# Patient Record
Sex: FEMALE | Employment: FULL TIME | URBAN - METROPOLITAN AREA
[De-identification: names, ages, dates, MRNs, and addresses within clinical notes are randomized per-mention and may not be internally consistent; named-entity substitution may affect disease eponyms.]

---

## 2020-08-20 ENCOUNTER — TELEPHONE (OUTPATIENT)
Dept: OBGYN CLINIC | Age: 30
End: 2020-08-20

## 2020-08-21 ENCOUNTER — OFFICE VISIT (OUTPATIENT)
Dept: OBGYN CLINIC | Age: 30
End: 2020-08-21
Payer: OTHER GOVERNMENT

## 2020-08-21 VITALS
TEMPERATURE: 99 F | SYSTOLIC BLOOD PRESSURE: 118 MMHG | HEIGHT: 60 IN | HEART RATE: 110 BPM | WEIGHT: 183 LBS | DIASTOLIC BLOOD PRESSURE: 78 MMHG | BODY MASS INDEX: 35.93 KG/M2

## 2020-08-21 PROCEDURE — 36415 COLL VENOUS BLD VENIPUNCTURE: CPT | Performed by: OBSTETRICS & GYNECOLOGY

## 2020-08-21 PROCEDURE — 99385 PREV VISIT NEW AGE 18-39: CPT | Performed by: OBSTETRICS & GYNECOLOGY

## 2020-08-21 RX ORDER — ALBUTEROL SULFATE 4 MG/1
4 TABLET, FILM COATED, EXTENDED RELEASE ORAL EVERY 12 HOURS
COMMUNITY

## 2020-08-21 RX ORDER — BUDESONIDE AND FORMOTEROL FUMARATE DIHYDRATE 160; 4.5 UG/1; UG/1
2 AEROSOL RESPIRATORY (INHALATION) 2 TIMES DAILY
COMMUNITY

## 2020-08-21 RX ORDER — LEVOTHYROXINE SODIUM 0.05 MG/1
50 TABLET ORAL DAILY
COMMUNITY
End: 2021-01-14 | Stop reason: SDUPTHER

## 2020-08-21 RX ORDER — DULOXETIN HYDROCHLORIDE 30 MG/1
30 CAPSULE, DELAYED RELEASE ORAL DAILY
COMMUNITY

## 2020-08-21 SDOH — HEALTH STABILITY: MENTAL HEALTH: HOW MANY STANDARD DRINKS CONTAINING ALCOHOL DO YOU HAVE ON A TYPICAL DAY?: 1 OR 2

## 2020-08-21 SDOH — HEALTH STABILITY: MENTAL HEALTH: HOW OFTEN DO YOU HAVE A DRINK CONTAINING ALCOHOL?: 2-4 TIMES A MONTH

## 2020-08-21 ASSESSMENT — ENCOUNTER SYMPTOMS
VOMITING: 0
NAUSEA: 0
DIARRHEA: 0
SHORTNESS OF BREATH: 0
ABDOMINAL PAIN: 0
CONSTIPATION: 0

## 2020-08-21 NOTE — PROGRESS NOTES
smears: denies    Medical History:  Past Medical History:   Diagnosis Date    Asthma     Hypothyroid     PTSD (post-traumatic stress disorder)        Surgical History:  Past Surgical History:   Procedure Laterality Date     SECTION      UMBILICAL HERNIA REPAIR         Medications:  Current Outpatient Medications   Medication Sig Dispense Refill    budesonide-formoterol (SYMBICORT) 160-4.5 MCG/ACT AERO Inhale 2 puffs into the lungs 2 times daily      levothyroxine (SYNTHROID) 50 MCG tablet Take 50 mcg by mouth Daily      DULoxetine (CYMBALTA) 30 MG extended release capsule Take 30 mg by mouth daily      albuterol (VOSPIRE ER) 4 MG extended release tablet Take 4 mg by mouth every 12 hours PRN       No current facility-administered medications for this visit. Allergies: Allergies   Allergen Reactions    Bactrim [Sulfamethoxazole-Trimethoprim] Hives       Social History:  Social History     Socioeconomic History    Marital status: Unknown     Spouse name: None    Number of children: None    Years of education: None    Highest education level: None   Occupational History    None   Social Needs    Financial resource strain: None    Food insecurity     Worry: None     Inability: None    Transportation needs     Medical: None     Non-medical: None   Tobacco Use    Smoking status: Never Smoker    Smokeless tobacco: Never Used   Substance and Sexual Activity    Alcohol use:  Yes     Alcohol/week: 1.0 standard drinks     Types: 1 Glasses of wine per week     Frequency: 2-4 times a month     Drinks per session: 1 or 2     Binge frequency: Never    Drug use: None    Sexual activity: Yes   Lifestyle    Physical activity     Days per week: None     Minutes per session: None    Stress: None   Relationships    Social connections     Talks on phone: None     Gets together: None     Attends Judaism service: None     Active member of club or organization: None     Attends meetings of clubs or organizations: None     Relationship status: None    Intimate partner violence     Fear of current or ex partner: None     Emotionally abused: None     Physically abused: None     Forced sexual activity: None   Other Topics Concern    None   Social History Narrative    None       Objective: Body mass index is 35.74 kg/m². /78 (Site: Left Upper Arm, Position: Sitting, Cuff Size: Large Adult)   Pulse 110   Temp 99 °F (37.2 °C)   Ht 5' (1.524 m)   Wt 183 lb (83 kg)   LMP 08/06/2020   BMI 35.74 kg/m²     Exam:   Physical Exam  Exam conducted with a chaperone present. Constitutional:       Appearance: She is well-developed. HENT:      Head: Normocephalic and atraumatic. Neck:      Musculoskeletal: Normal range of motion. Cardiovascular:      Rate and Rhythm: Normal rate and regular rhythm. Pulmonary:      Effort: Pulmonary effort is normal. No respiratory distress. Chest:      Breasts:         Right: Normal. No mass, nipple discharge or skin change. Left: Normal. No mass, nipple discharge or skin change. Abdominal:      General: There is no distension. Palpations: Abdomen is soft. Tenderness: There is no abdominal tenderness. There is no guarding or rebound. Genitourinary:     Comments: Pelvic exam: VULVA: normal appearing vulva with no masses, tenderness or lesions, VAGINA: normal appearing vagina with normal color and discharge, no lesions, CERVIX: normal appearing cervix without discharge or lesions, UTERUS: uterus is normal size, shape, consistency and nontender, ADNEXA: normal adnexa in size, nontender and no masses. Neurological:      Mental Status: She is alert and oriented to person, place, and time. Assessment/Plan:  31 y.o. P1 presenting for her annual exam:    1. Encounter for annual routine gynecological examination  Discussed age appropriate screening recommendations, pap smear sent today. Discussed breast self awareness, STI screening deferred.    -

## 2020-08-22 LAB
PROLACTIN: 11.9 NG/ML
TSH REFLEX: 3.04 UIU/ML (ref 0.27–4.2)

## 2020-10-26 ENCOUNTER — OFFICE VISIT (OUTPATIENT)
Dept: OBGYN CLINIC | Age: 30
End: 2020-10-26
Payer: OTHER GOVERNMENT

## 2020-10-26 ENCOUNTER — INITIAL PRENATAL (OUTPATIENT)
Dept: OBGYN CLINIC | Age: 30
End: 2020-10-26
Payer: OTHER GOVERNMENT

## 2020-10-26 VITALS
BODY MASS INDEX: 37.46 KG/M2 | DIASTOLIC BLOOD PRESSURE: 72 MMHG | HEART RATE: 88 BPM | WEIGHT: 191.8 LBS | SYSTOLIC BLOOD PRESSURE: 130 MMHG

## 2020-10-26 PROBLEM — Z87.59 HISTORY OF GESTATIONAL HYPERTENSION: Status: ACTIVE | Noted: 2020-10-26

## 2020-10-26 PROBLEM — O99.340 DEPRESSION AFFECTING PREGNANCY: Status: ACTIVE | Noted: 2020-10-26

## 2020-10-26 PROBLEM — J45.20 MILD INTERMITTENT ASTHMA WITHOUT COMPLICATION: Status: ACTIVE | Noted: 2020-10-26

## 2020-10-26 PROBLEM — Z34.91 PRENATAL CARE IN FIRST TRIMESTER: Status: ACTIVE | Noted: 2020-10-26

## 2020-10-26 PROBLEM — F32.A DEPRESSION AFFECTING PREGNANCY: Status: ACTIVE | Noted: 2020-10-26

## 2020-10-26 PROBLEM — Z98.891 HISTORY OF CESAREAN DELIVERY: Status: ACTIVE | Noted: 2020-10-26

## 2020-10-26 PROBLEM — E03.9 HYPOTHYROIDISM: Status: ACTIVE | Noted: 2020-10-26

## 2020-10-26 LAB
A/G RATIO: 1.2 (ref 1.1–2.2)
ABO/RH: NORMAL
ALBUMIN SERPL-MCNC: 3.8 G/DL (ref 3.4–5)
ALP BLD-CCNC: 61 U/L (ref 40–129)
ALT SERPL-CCNC: 26 U/L (ref 10–40)
AMPHETAMINE SCREEN, URINE: NORMAL
ANION GAP SERPL CALCULATED.3IONS-SCNC: 10 MMOL/L (ref 3–16)
ANTIBODY SCREEN: NORMAL
AST SERPL-CCNC: 29 U/L (ref 15–37)
BACTERIA: ABNORMAL /HPF
BARBITURATE SCREEN URINE: NORMAL
BASOPHILS ABSOLUTE: 0 K/UL (ref 0–0.2)
BASOPHILS RELATIVE PERCENT: 0.4 %
BENZODIAZEPINE SCREEN, URINE: NORMAL
BILIRUB SERPL-MCNC: <0.2 MG/DL (ref 0–1)
BILIRUBIN URINE: NEGATIVE
BLOOD, URINE: NEGATIVE
BUN BLDV-MCNC: 12 MG/DL (ref 7–20)
BUPRENORPHINE URINE: NORMAL
CALCIUM SERPL-MCNC: 9.6 MG/DL (ref 8.3–10.6)
CANNABINOID SCREEN URINE: NORMAL
CHLORIDE BLD-SCNC: 103 MMOL/L (ref 99–110)
CLARITY: ABNORMAL
CO2: 25 MMOL/L (ref 21–32)
COCAINE METABOLITE SCREEN URINE: NORMAL
COLOR: YELLOW
CONTROL: ABNORMAL
CREAT SERPL-MCNC: 0.5 MG/DL (ref 0.6–1.1)
CRL: NORMAL
CRYSTALS, UA: ABNORMAL /HPF
EOSINOPHILS ABSOLUTE: 0.1 K/UL (ref 0–0.6)
EOSINOPHILS RELATIVE PERCENT: 1.1 %
EPITHELIAL CELLS, UA: 6 /HPF (ref 0–5)
GFR AFRICAN AMERICAN: >60
GFR NON-AFRICAN AMERICAN: >60
GLOBULIN: 3.1 G/DL
GLUCOSE BLD-MCNC: 82 MG/DL (ref 70–99)
GLUCOSE URINE: NEGATIVE MG/DL
HCT VFR BLD CALC: 38 % (ref 36–48)
HEMOGLOBIN: 13.2 G/DL (ref 12–16)
HEPATITIS B SURFACE ANTIGEN INTERPRETATION: NORMAL
HYALINE CASTS: 1 /LPF (ref 0–8)
KETONES, URINE: NEGATIVE MG/DL
LEUKOCYTE ESTERASE, URINE: NEGATIVE
LYMPHOCYTES ABSOLUTE: 1.1 K/UL (ref 1–5.1)
LYMPHOCYTES RELATIVE PERCENT: 13.3 %
Lab: NORMAL
MCH RBC QN AUTO: 31.3 PG (ref 26–34)
MCHC RBC AUTO-ENTMCNC: 34.7 G/DL (ref 31–36)
MCV RBC AUTO: 90.1 FL (ref 80–100)
METHADONE SCREEN, URINE: NORMAL
MICROSCOPIC EXAMINATION: YES
MONOCYTES ABSOLUTE: 0.7 K/UL (ref 0–1.3)
MONOCYTES RELATIVE PERCENT: 8.8 %
NEUTROPHILS ABSOLUTE: 6.2 K/UL (ref 1.7–7.7)
NEUTROPHILS RELATIVE PERCENT: 76.4 %
NITRITE, URINE: NEGATIVE
OPIATE SCREEN URINE: NORMAL
OXYCODONE URINE: NORMAL
PDW BLD-RTO: 13.2 % (ref 12.4–15.4)
PH UA: 7.5 (ref 5–8)
PH UA: 8
PHENCYCLIDINE SCREEN URINE: NORMAL
PLATELET # BLD: 184 K/UL (ref 135–450)
PMV BLD AUTO: 11 FL (ref 5–10.5)
POTASSIUM SERPL-SCNC: 4.1 MMOL/L (ref 3.5–5.1)
PREGNANCY TEST URINE, POC: POSITIVE
PROPOXYPHENE SCREEN: NORMAL
PROTEIN UA: NEGATIVE MG/DL
RBC # BLD: 4.22 M/UL (ref 4–5.2)
RBC UA: 2 /HPF (ref 0–4)
RUBELLA ANTIBODY IGG: 233.6 IU/ML
SAC DIAMETER: NORMAL
SODIUM BLD-SCNC: 138 MMOL/L (ref 136–145)
SPECIFIC GRAVITY UA: 1.02 (ref 1–1.03)
TOTAL PROTEIN: 6.9 G/DL (ref 6.4–8.2)
TSH REFLEX: 3.86 UIU/ML (ref 0.27–4.2)
URINE TYPE: ABNORMAL
UROBILINOGEN, URINE: 0.2 E.U./DL
WBC # BLD: 8.1 K/UL (ref 4–11)
WBC UA: 0 /HPF (ref 0–5)

## 2020-10-26 PROCEDURE — 0500F INITIAL PRENATAL CARE VISIT: CPT | Performed by: OBSTETRICS & GYNECOLOGY

## 2020-10-26 PROCEDURE — 36415 COLL VENOUS BLD VENIPUNCTURE: CPT | Performed by: OBSTETRICS & GYNECOLOGY

## 2020-10-26 PROCEDURE — 76801 OB US < 14 WKS SINGLE FETUS: CPT | Performed by: OBSTETRICS & GYNECOLOGY

## 2020-10-26 RX ORDER — PYRIDOXINE HCL (VITAMIN B6) 25 MG
25 TABLET ORAL 2 TIMES DAILY PRN
Qty: 30 TABLET | Refills: 1 | Status: ON HOLD | OUTPATIENT
Start: 2020-10-26 | End: 2021-04-10 | Stop reason: HOSPADM

## 2020-10-26 ASSESSMENT — ENCOUNTER SYMPTOMS
DIARRHEA: 0
VOMITING: 0
NAUSEA: 1
CONSTIPATION: 0
SHORTNESS OF BREATH: 0

## 2020-10-26 NOTE — PROGRESS NOTES
PO) Take by mouth      budesonide-formoterol (SYMBICORT) 160-4.5 MCG/ACT AERO Inhale 2 puffs into the lungs 2 times daily      levothyroxine (SYNTHROID) 50 MCG tablet Take 50 mcg by mouth Daily      albuterol (VOSPIRE ER) 4 MG extended release tablet Take 4 mg by mouth every 12 hours PRN      DULoxetine (CYMBALTA) 30 MG extended release capsule Take 30 mg by mouth daily       No current facility-administered medications for this visit. Allergies:   Allergies   Allergen Reactions    Bactrim [Sulfamethoxazole-Trimethoprim] Hives       Social History:  Social History     Socioeconomic History    Marital status: Unknown     Spouse name: None    Number of children: None    Years of education: None    Highest education level: None   Occupational History    None   Social Needs    Financial resource strain: None    Food insecurity     Worry: None     Inability: None    Transportation needs     Medical: None     Non-medical: None   Tobacco Use    Smoking status: Never Smoker    Smokeless tobacco: Never Used   Substance and Sexual Activity    Alcohol use: Not Currently     Alcohol/week: 1.0 standard drinks     Types: 1 Glasses of wine per week     Frequency: 2-4 times a month     Drinks per session: 1 or 2     Binge frequency: Never    Drug use: Never    Sexual activity: Yes   Lifestyle    Physical activity     Days per week: None     Minutes per session: None    Stress: None   Relationships    Social connections     Talks on phone: None     Gets together: None     Attends Faith service: None     Active member of club or organization: None     Attends meetings of clubs or organizations: None     Relationship status: None    Intimate partner violence     Fear of current or ex partner: None     Emotionally abused: None     Physically abused: None     Forced sexual activity: None   Other Topics Concern    None   Social History Narrative    None       PhysicalExam:  /72   Pulse 88   Wt 191 lb 12.8 oz (87 kg)   LMP 08/06/2020   BMI 37.46 kg/m²   Physical Exam  Constitutional:       Appearance: Normal appearance. HENT:      Head: Normocephalic. Cardiovascular:      Rate and Rhythm: Normal rate and regular rhythm. Pulmonary:      Effort: Pulmonary effort is normal. No respiratory distress. Abdominal:      General: There is no distension. Palpations: Abdomen is soft. There is no mass. Tenderness: There is no abdominal tenderness. There is no guarding or rebound. Genitourinary:     Comments: Pelvic exam: VULVA: normal appearing vulva with no masses, tenderness or lesions, VAGINA: normal appearing vagina with normal color and discharge, no lesions, CERVIX: normal appearing cervix without discharge or lesions. Skin:     General: Skin is warm and dry. Neurological:      General: No focal deficit present. Mental Status: She is alert. Psychiatric:         Mood and Affect: Mood normal.           Labs:  No visits with results within 1 Day(s) from this visit. Latest known visit with results is:   Office Visit on 08/21/2020   Component Date Value    TSH 08/21/2020 3.04     Prolactin 08/21/2020 11.9        Most Recent Ultrasound:  Impression    OBSTETRIC ULTRASOUND--1ST TRIMESTER         DATE: 10/26/2020         PHYSICIAN: Loly Simms M.D.         SONOGRAPHER: [facility]         INDICATION: Amenorrhea         TYPE OF SCAN:  vaginal         FINDINGS:           The cul de sac is normal.  The cervix is normal.  The uterus is gravid.         The uterus measures 10.15 cm x 6.02 cm x 5.68 cm. No uterine anomalies are evident.         The right ovary is present. The right ovary measures 1.97 cm x 1.44 cm x 1.31 cm.           The left ovary is present.  The left ovary measures 2.99 cm x 1.96 cm x 1.86 cm.           There is a single intrauterine pregnancy identified.  A fetal pole is noted with a CRL measuring 1.68 cm, consistent with gestational age of 8weeks and 1days and EDC of 2021Zada Koch is a 2 day discrepancy when compared with the gestational age of 7weeks and 6days and EDC of 2021 set by FDP (2020). Yolk sac is present and measures 0.32 cm.      Fetal cardiac activity is present at 171 bpm.              IMPRESSION:      Single IUP with cardiac activity. Final EDC 2021.         Imaging is limited secondary to bowel gas.     Patient is well aware of the limitations of ultrasound in the detection of anomalies.               Assessment/Plan:   Angel Sesay is a 31 y.o. Stann [place] at 7+6 who presents for Initial OB visit    Problem List Items Addressed This Visit        Gynecology/Obstetric Problems    Prenatal care in first trimester - Primary     - FWB: reassuring by US today  - Genetic screening: desires NT, orders signed today  - Anatomy scan: plan at 20 weeks  - Flu shot: discuss at next visit  - Tdap: plan at 28wks  - PNL: sent today         Relevant Orders    C.trachomatis N.gonorrhoeae DNA    URINALYSIS WITH MICROSCOPIC    Culture, Urine    POCT urine pregnancy    VAGINAL PATHOGENS PROBE *A    TYPE AND SCREEN    CBC WITH AUTO DIFFERENTIAL    RUBELLA ANTIBODY, IGG    Syphilis Antibody Cascading Reflex    HEPATITIS B SURFACE ANTIGEN    TSH with Reflex    HIV-1 AND HIV-2 ANTIBODIES    Drug Screen Multi Urine With Bup    COMPREHENSIVE METABOLIC PANEL       Other    Hypothyroidism     Currently on synthroid 50mcg daily  - repeat TSH sent today         Relevant Orders    TSH with Reflex    History of gestational hypertension     Reports prior gHTN in labor  - baseline CBC/CMP today  - plan for baseline 24hr UP at next visit         Relevant Orders    CBC WITH AUTO DIFFERENTIAL    COMPREHENSIVE METABOLIC PANEL    Depression affecting pregnancy     On Cymbalta  - continue to monitor         Mild intermittent asthma without complication    History of  delivery     Desires RCD           Other Visit Diagnoses     Possible exposure to STD        Relevant Orders C.trachomatis N.gonorrhoeae DNA    URINALYSIS WITH MICROSCOPIC    Culture, Urine    VAGINAL PATHOGENS PROBE *A    Nausea and vomiting of pregnancy, antepartum        Rx B6/doxylamine sent today          Dispo: 4 weeks for Rik Zepeda MD

## 2020-10-26 NOTE — ASSESSMENT & PLAN NOTE
- FWB: reassuring by US today  - Genetic screening: desires NT, orders signed today  - Anatomy scan: plan at 20 weeks  - Flu shot: discuss at next visit  - Tdap: plan at 28wks  - PNL: sent today

## 2020-10-27 LAB
C TRACH DNA GENITAL QL NAA+PROBE: NEGATIVE
CANDIDA SPECIES, DNA PROBE: NORMAL
GARDNERELLA VAGINALIS, DNA PROBE: NORMAL
HIV AG/AB: NORMAL
HIV ANTIGEN: NORMAL
HIV-1 ANTIBODY: NORMAL
HIV-2 AB: NORMAL
N. GONORRHOEAE DNA: NEGATIVE
TOTAL SYPHILLIS IGG/IGM: NORMAL
TRICHOMONAS VAGINALIS DNA: NORMAL
URINE CULTURE, ROUTINE: NORMAL

## 2020-11-24 ENCOUNTER — TELEPHONE (OUTPATIENT)
Dept: OBGYN CLINIC | Age: 30
End: 2020-11-24

## 2020-11-25 ENCOUNTER — INITIAL PRENATAL (OUTPATIENT)
Dept: OBGYN CLINIC | Age: 30
End: 2020-11-25

## 2020-11-25 VITALS
BODY MASS INDEX: 37.4 KG/M2 | WEIGHT: 191.5 LBS | DIASTOLIC BLOOD PRESSURE: 80 MMHG | SYSTOLIC BLOOD PRESSURE: 116 MMHG | HEART RATE: 97 BPM

## 2020-11-25 PROCEDURE — 0502F SUBSEQUENT PRENATAL CARE: CPT | Performed by: OBSTETRICS & GYNECOLOGY

## 2020-11-25 RX ORDER — ONDANSETRON 4 MG/1
4 TABLET, ORALLY DISINTEGRATING ORAL EVERY 8 HOURS PRN
Qty: 20 TABLET | Refills: 1 | Status: SHIPPED | OUTPATIENT
Start: 2020-11-25 | End: 2020-12-23 | Stop reason: SDUPTHER

## 2020-11-25 RX ORDER — ASPIRIN 81 MG/1
81 TABLET ORAL DAILY
Qty: 30 TABLET | Refills: 3 | Status: ON HOLD | OUTPATIENT
Start: 2020-11-25 | End: 2021-06-03 | Stop reason: HOSPADM

## 2020-11-25 NOTE — ASSESSMENT & PLAN NOTE
- FWB: reassuring by US today  - Genetic screening: missed appt for NT, would like to just do MQS at next visit  - Anatomy scan: plan at 20 weeks  - Flu shot: plan at next visit  - Tdap: plan at 28wks  - PNL: A+/ab-, RI, HepB neg, RPRnr, HIV neg, UDS negative, urine culture negative, GCCT/trich next, Hgb 13.2

## 2020-11-25 NOTE — PROGRESS NOTES
Return OB Office Visit    CC:   Chief Complaint   Patient presents with    Routine Prenatal Visit       HPI:  Pt seen and examined. No concerns/complaints. Denies VB, leaking, uterine cramps. No fetal movement yet. Has started vomiting in the last week, nausea is OK. Worst in the morning, then better throughout the day. Maternal wellness questionnaire reviewed - no concerns today. Score 10.      Objective:  /80   Pulse 97   Wt 191 lb 8 oz (86.9 kg)   LMP 2020   BMI 37.40 kg/m²   Gen: AO, NAD  Abd: Soft, NT  FHT: 162 by BSUS    Assessment/Plan:  31 y.o. Ester Sebastian at 12w1d (Estimated Date of Delivery: 21) presents for PHILLIP appointment:     Problem List Items Addressed This Visit        Gynecology/Obstetric Problems    Prenatal care in first trimester - Primary     - FWB: reassuring by US today  - Genetic screening: missed appt for NT, would like to just do MQS at next visit  - Anatomy scan: plan at 20 weeks  - Flu shot: plan at next visit  - Tdap: plan at 28wks  - PNL: A+/ab-, RI, HepB neg, RPRnr, HIV neg, UDS negative, urine culture negative, GCCT/trich next, Hgb 13.2            Other    Hypothyroidism     Currently on synthroid 50mcg daily  - repeat TSH wnl, continue to monitor         History of gestational hypertension     Reports prior gHTN in labor  - baseline CBC/CMP wnl  - given collection supplies to bring 24HUP to next visit  - daily ASA         Depression affecting pregnancy     On Cymbalta  - continue to monitor  - [facility]         History of  delivery     Desires RCD               Dispo: RTC in 4 weeks, MQS and flu shot at that time  Carlos Alberto Monaco MD

## 2020-12-17 ENCOUNTER — TELEPHONE (OUTPATIENT)
Dept: OBGYN CLINIC | Age: 30
End: 2020-12-17

## 2020-12-18 ENCOUNTER — ROUTINE PRENATAL (OUTPATIENT)
Dept: OBGYN CLINIC | Age: 30
End: 2020-12-18
Payer: OTHER GOVERNMENT

## 2020-12-18 ENCOUNTER — TELEPHONE (OUTPATIENT)
Dept: OBGYN CLINIC | Age: 30
End: 2020-12-18

## 2020-12-18 VITALS
DIASTOLIC BLOOD PRESSURE: 82 MMHG | HEART RATE: 99 BPM | SYSTOLIC BLOOD PRESSURE: 138 MMHG | WEIGHT: 195.8 LBS | BODY MASS INDEX: 38.24 KG/M2

## 2020-12-18 PROBLEM — Z34.92 PRENATAL CARE IN SECOND TRIMESTER: Status: ACTIVE | Noted: 2020-10-26

## 2020-12-18 PROCEDURE — 0502F SUBSEQUENT PRENATAL CARE: CPT | Performed by: OBSTETRICS & GYNECOLOGY

## 2020-12-18 PROCEDURE — 36415 COLL VENOUS BLD VENIPUNCTURE: CPT | Performed by: OBSTETRICS & GYNECOLOGY

## 2020-12-18 RX ORDER — ONDANSETRON 4 MG/1
4 TABLET, ORALLY DISINTEGRATING ORAL EVERY 8 HOURS PRN
Qty: 20 TABLET | Refills: 1 | Status: SHIPPED | OUTPATIENT
Start: 2020-12-18 | End: 2020-12-23

## 2020-12-18 NOTE — ASSESSMENT & PLAN NOTE
- FWB: reassuring by micky today  - Genetic screening: MQS drawn today  - Anatomy scan: plan at 20 weeks  - Flu shot: plan at next visit  - Tdap: plan at 28wks  - PNL: A+/ab-, RI, HepB neg, RPRnr, HIV neg, UDS negative, urine culture negative, GCCT/trich next, Hgb 13.2

## 2020-12-18 NOTE — PROGRESS NOTES
Return OB Office Visit    CC:   Chief Complaint   Patient presents with    Routine Prenatal Visit       HPI:  Pt seen and examined. No concerns/complaints. Denies VB, LOF. ? small fetal movements. No regular painful cramps. Still having nausea, also now vomiting. Feels like it is getting worse. Using zofran every other day. Also using B6 and unisom. Mostly in the first half of the day, tolerating food but has lower appetite. Having smaller portions. Maternal wellness questionnaire reviewed - no concerns today. Score 11. Working with Dylan.      Objective:  /82   Pulse 99   Wt 195 lb 12.8 oz (88.8 kg)   LMP 2020   BMI 38.24 kg/m²   Gen: AO, NAD  Abd: Soft, NT  FHT: 151  Ext: Mild LE edema    Assessment/Plan:  31 y.o.  at 15w3d (Estimated Date of Delivery: 21) presents for PHILLIP appointment:     Problem List Items Addressed This Visit        Gynecology/Obstetric Problems    Prenatal care in second trimester - Primary     - FWB: reassuring by micky today  - Genetic screening: MQS drawn today  - Anatomy scan: plan at 20 weeks  - Flu shot: plan at next visit  - Tdap: plan at 28wks  - PNL: A+/ab-, RI, HepB neg, RPRnr, HIV neg, UDS negative, urine culture negative, GCCT/trich next, Hgb 13.2         Relevant Orders    CBC WITH AUTO DIFFERENTIAL    COMPREHENSIVE METABOLIC PANEL    MATERNAL SCREEN 4       Other    Hypothyroidism     Currently on synthroid 50mcg daily  - recheck TSH today         Relevant Orders    TSH with Reflex    History of gestational hypertension     Reports prior gHTN in labor  - baseline CBC/CMP wnl  - 24hr urine sent today  - continue daily ASA         Relevant Orders    CBC WITH AUTO DIFFERENTIAL    COMPREHENSIVE METABOLIC PANEL    PROTEIN, URINE, 24 HOUR    Depression affecting pregnancy     On Cymbalta  - continue to monitor  - [facility]         History of  delivery     Desires RCD               Dispo: RTC in 4 nino Corrales MD

## 2020-12-18 NOTE — TELEPHONE ENCOUNTER
Patient calling to have an order placed for the EKG to the [facility]. The EKG would be covered by insurance this way. Routing to Dr. Margo Lopez.

## 2020-12-19 LAB
24HR URINE VOLUME (ML): 500 ML
A/G RATIO: 1.1 (ref 1.1–2.2)
ALBUMIN SERPL-MCNC: 3.7 G/DL (ref 3.4–5)
ALP BLD-CCNC: 67 U/L (ref 40–129)
ALT SERPL-CCNC: 29 U/L (ref 10–40)
ANION GAP SERPL CALCULATED.3IONS-SCNC: 17 MMOL/L (ref 3–16)
AST SERPL-CCNC: 29 U/L (ref 15–37)
BASOPHILS ABSOLUTE: 0.1 K/UL (ref 0–0.2)
BASOPHILS RELATIVE PERCENT: 0.7 %
BILIRUB SERPL-MCNC: <0.2 MG/DL (ref 0–1)
BUN BLDV-MCNC: 11 MG/DL (ref 7–20)
CALCIUM SERPL-MCNC: 9.5 MG/DL (ref 8.3–10.6)
CHLORIDE BLD-SCNC: 103 MMOL/L (ref 99–110)
CO2: 19 MMOL/L (ref 21–32)
CREAT SERPL-MCNC: <0.5 MG/DL (ref 0.6–1.1)
CREATININE 24 HOUR URINE: 0.6 G/24HR (ref 0.6–1.5)
EOSINOPHILS ABSOLUTE: 0.1 K/UL (ref 0–0.6)
EOSINOPHILS RELATIVE PERCENT: 1.6 %
GFR AFRICAN AMERICAN: >60
GFR NON-AFRICAN AMERICAN: >60
GLOBULIN: 3.3 G/DL
GLUCOSE BLD-MCNC: 77 MG/DL (ref 70–99)
HCT VFR BLD CALC: 36.9 % (ref 36–48)
HEMOGLOBIN: 12.3 G/DL (ref 12–16)
LYMPHOCYTES ABSOLUTE: 0.9 K/UL (ref 1–5.1)
LYMPHOCYTES RELATIVE PERCENT: 11.4 %
MCH RBC QN AUTO: 30.4 PG (ref 26–34)
MCHC RBC AUTO-ENTMCNC: 33.4 G/DL (ref 31–36)
MCV RBC AUTO: 91.1 FL (ref 80–100)
MONOCYTES ABSOLUTE: 0.6 K/UL (ref 0–1.3)
MONOCYTES RELATIVE PERCENT: 7.9 %
NEUTROPHILS ABSOLUTE: 6.3 K/UL (ref 1.7–7.7)
NEUTROPHILS RELATIVE PERCENT: 78.4 %
PDW BLD-RTO: 13.7 % (ref 12.4–15.4)
PLATELET # BLD: 174 K/UL (ref 135–450)
PMV BLD AUTO: 10.9 FL (ref 5–10.5)
POTASSIUM SERPL-SCNC: 4.4 MMOL/L (ref 3.5–5.1)
PROTEIN 24 HOUR URINE: 0.05 G/24HR
RBC # BLD: 4.04 M/UL (ref 4–5.2)
SODIUM BLD-SCNC: 139 MMOL/L (ref 136–145)
TOTAL PROTEIN: 7 G/DL (ref 6.4–8.2)
TSH REFLEX: 2.17 UIU/ML (ref 0.27–4.2)
WBC # BLD: 8 K/UL (ref 4–11)

## 2020-12-22 ENCOUNTER — TELEPHONE (OUTPATIENT)
Dept: OBGYN CLINIC | Age: 30
End: 2020-12-22

## 2020-12-22 LAB
AFP INTERPRETATION: NORMAL
AFP MOM: 1.29
AFP SPECIMEN: NORMAL
D-INHIBIN: 115 PG/ML
DATING: NORMAL
EER MATERNAL SCREEN AFP, HCG, EST, INH: NORMAL
ESTIMATED DUE DATE: NORMAL
FETUS COUNT: NORMAL
GESTATIONAL AGE CALC AT COLLECT: NORMAL
HISTORY OF ANEUPLOIDY?: NO
HISTORY/NEURAL TUBE DEFECTS: NO
INSULIN DEP. DIABETIC: NO
MATERNAL AGE AT EDD: 29.3 YR
MATERNAL WEIGHT: NORMAL
MOM FOR HCG, 2ND TRIMESTER: 0.62
MOM FOR UE3: 0.9
MOM INHIBN: 0.78
PATIENT'S HCG, 2ND TRIMESTER: NORMAL IU/L
PT AFP: 32 NG/ML
PT UE3: 0.75 NG/ML
RACE: NORMAL
SMOKING: NO

## 2020-12-22 NOTE — TELEPHONE ENCOUNTER
Called Dr Rick Livingston office and spoke with Nurse Howard Hoff. She took the verbal order for Zofran as above and will call the office back if any issues.

## 2020-12-23 RX ORDER — ONDANSETRON 4 MG/1
4 TABLET, ORALLY DISINTEGRATING ORAL EVERY 8 HOURS PRN
Qty: 20 TABLET | Refills: 1 | Status: SHIPPED | OUTPATIENT
Start: 2020-12-23 | End: 2021-02-10 | Stop reason: SDUPTHER

## 2020-12-23 NOTE — TELEPHONE ENCOUNTER
Patient called in and stated she has a fax number we should send the script to at the [place] Fax: [phone] with name, , and last 4 of social.     Stated she also now has the maternity coordinator may be reached at Nurse Detts: [phone] ext [phone].

## 2020-12-23 NOTE — TELEPHONE ENCOUNTER
Placed call to [facility]. Stated they dont have the script. Want new one faxed to [phone], [address].

## 2020-12-23 NOTE — TELEPHONE ENCOUNTER
Prescription was printed and faxed last week, can we see if it is in the office and can be resent?  Thanks

## 2020-12-31 ENCOUNTER — TELEPHONE (OUTPATIENT)
Dept: OBGYN CLINIC | Age: 30
End: 2020-12-31

## 2020-12-31 NOTE — TELEPHONE ENCOUNTER
Patient called in and stated [facility] wherever Dr. Colleen Carbajal wants to refer her to. Stated she just needs the referral number attached when sent for billing.      Routing to Dr. Colleen Carbajal

## 2021-01-04 NOTE — TELEPHONE ENCOUNTER
[phone] (home)      Patient called and aware of central scheduling number, and also to access her mychart.

## 2021-01-08 ENCOUNTER — HOSPITAL ENCOUNTER (OUTPATIENT)
Age: 31
Discharge: HOME | End: 2021-01-08
Payer: OTHER GOVERNMENT

## 2021-01-08 DIAGNOSIS — R00.2 PALPITATIONS: ICD-10-CM

## 2021-01-08 LAB
EKG ATRIAL RATE: 83 BPM
EKG DIAGNOSIS: NORMAL
EKG P AXIS: 56 DEGREES
EKG P-R INTERVAL: 140 MS
EKG Q-T INTERVAL: 372 MS
EKG QRS DURATION: 92 MS
EKG QTC CALCULATION (BAZETT): 437 MS
EKG R AXIS: 69 DEGREES
EKG T AXIS: 28 DEGREES
EKG VENTRICULAR RATE: 83 BPM

## 2021-01-08 PROCEDURE — 93005 ELECTROCARDIOGRAM TRACING: CPT

## 2021-01-08 PROCEDURE — 93010 ELECTROCARDIOGRAM REPORT: CPT | Performed by: INTERNAL MEDICINE

## 2021-01-12 ENCOUNTER — TELEPHONE (OUTPATIENT)
Dept: OBGYN CLINIC | Age: 31
End: 2021-01-12

## 2021-01-13 ENCOUNTER — ROUTINE PRENATAL (OUTPATIENT)
Dept: OBGYN CLINIC | Age: 31
End: 2021-01-13

## 2021-01-13 ENCOUNTER — OFFICE VISIT (OUTPATIENT)
Dept: OBGYN CLINIC | Age: 31
End: 2021-01-13
Payer: OTHER GOVERNMENT

## 2021-01-13 VITALS
WEIGHT: 195.5 LBS | BODY MASS INDEX: 38.18 KG/M2 | HEART RATE: 85 BPM | SYSTOLIC BLOOD PRESSURE: 118 MMHG | DIASTOLIC BLOOD PRESSURE: 82 MMHG

## 2021-01-13 DIAGNOSIS — Z87.59 HISTORY OF GESTATIONAL HYPERTENSION: ICD-10-CM

## 2021-01-13 DIAGNOSIS — Z34.92 PRENATAL CARE IN SECOND TRIMESTER: Primary | ICD-10-CM

## 2021-01-13 DIAGNOSIS — Z98.891 HISTORY OF CESAREAN DELIVERY: ICD-10-CM

## 2021-01-13 DIAGNOSIS — E03.9 HYPOTHYROIDISM, UNSPECIFIED TYPE: ICD-10-CM

## 2021-01-13 DIAGNOSIS — F32.A DEPRESSION AFFECTING PREGNANCY: ICD-10-CM

## 2021-01-13 DIAGNOSIS — O99.340 DEPRESSION AFFECTING PREGNANCY: ICD-10-CM

## 2021-01-13 DIAGNOSIS — O44.40 LOW-LYING PLACENTA: ICD-10-CM

## 2021-01-13 LAB
ABDOMINAL CIRCUMFERENCE: NORMAL
BIPARIETAL DIAMETER: NORMAL
ESTIMATED FETAL WEIGHT: NORMAL
FEMORAL DIAMETER: NORMAL
HC/AC: NORMAL
HEAD CIRCUMFERENCE: NORMAL

## 2021-01-13 PROCEDURE — 76805 OB US >/= 14 WKS SNGL FETUS: CPT | Performed by: OBSTETRICS & GYNECOLOGY

## 2021-01-13 PROCEDURE — 0502F SUBSEQUENT PRENATAL CARE: CPT | Performed by: OBSTETRICS & GYNECOLOGY

## 2021-01-13 NOTE — PROGRESS NOTES
Temp 97.3 F infrared   Maternal emotional well being screening form completed and reviewed with patient. Current score is 8. Patient given referral to [facility] ([phone]):  No

## 2021-01-13 NOTE — PROGRESS NOTES
Return OB Office Visit    CC:   Chief Complaint   Patient presents with    Routine Prenatal Visit       HPI:  Pt seen and examined. No concerns/complaints. Denies VB, discharge, cramps/ctx. +FM. Still having nausea in the morning, starting get more of an appetite. Using the zofran still about once/day. No more chest palpitations or dizziness, EKG was normal. Otherwise feeling well today. Maternal wellness questionnaire reviewed - no concerns today. Score 8. Patient is already established with a counselor at the [place]. Objective:  /82   Pulse 85   Wt 195 lb 8 oz (88.7 kg)   LMP 09/01/2020   BMI 38.18 kg/m²   Gen: AO, NAD  Abd: Soft, NT  FHT: 146    Ultrasound:  Impression   OBSTETRIC ULTRASOUND -- SECOND TRIMESTER       DATE:  01/13/2021       PHYSICIAN: Praveen Beltran M.D.       SONOGRAPHER: [facility]       INDICATION:  Second trimester, Anatomical screening       TYPE OF SCAN: vaginal, abdominal 3.5 MHz 5MHz       FINDINGS:         A single viable intrauterine pregnancy is noted in cephalic presentation. Cardiac and somatic activity are noted.       The following values were obtained:   Fetal heart rate 146bpm   BPD 4.72cm 89.7 %   Head Circumference 16.82cm 58.9 %    Abdominal Circumference 15.96cm 94.1 %   Femur Length 3.02cm 50.3 %   Humerus Length 2.98cm 73.3 %   Cerebellum 2.05cm 68.0 %   Amniotic fluid DVP 5.93cm   EFW 338g 93.7 percentile       Subjective amniotic fluid volume is normal. Based on sonographic criteria, the estimated fetal age is 19weeks and 6days with EDC of 06/03/2021. Mariaelena Rivera is a 5 day discordance with the established EDC of 06/08/2021. The patient has an anterior placenta that is 1.71 cm to the internal cervical os. The evaluation of the lower uterine segment and cervix reveals normal appearing anatomy. Transvaginal cervical length is 5.76cm with no funneling noted. The uterus is unremarkable/gravid.  Maternal ovaries and adnexae are not well visualized due to the size of the uterus and patient's gravid state.       Normal Anatomy Seen:   C, T, L Spine CSP   Kidneys Lateral ventricles   Umbilical arteries Cerebellum              Bladder Cisterna magna              Nuchal fold   Diaphragm Upper extremities   Stomach Nose/lips Lower extremities   ACI PCI Choroid plexus       Suboptimal anatomy seen:   Fetal heart, face, sacrum, profile,       Abnormal anatomy seen:   Low-lying placenta       The fetal genitalia is noted to be Male.        IMPRESSION:   Single living IUP. No gross structural abnormalities are visualized.  Amniotic fluid volume is subjectively normal.       The patient is well aware of the limitations of ultrasound in the detection of fetal anomalies.  The scan is limited by fetal position and maternal body habitus.              Assessment/Plan:  31 y.o. Sury Duarte at 19w1d (Estimated Date of Delivery: 21) presents for PHILLIP appointment:     Problem List Items Addressed This Visit        Gynecology/Obstetric Problems    Prenatal care in second trimester - Primary     - FWB: reassuring by US today  - Genetic screening: MQS low risk  - Anatomy scan: done 21, suboptimal due to fetal positioning, low lying anterior placenta, normal CL and normal fluid, male fetus    - repeat US in 4 weeks  - Flu shot: discuss at next visit  - Tdap: plan at 28wks  - PNL: A+/ab-, RI, HepB neg, RPRnr, HIV neg, UDS negative, urine culture negative, GCCT/trich next, Hgb 13.2         Low-lying placenta     Will reevaluate at next visit, check placenta/uterine interface given h/o prior CD            Other    Hypothyroidism     Currently on synthroid 50mcg daily  - TSH  2.17  - will repeat with 3T labs         History of gestational hypertension     Reports prior gHTN in labor  - baseline CBC/CMP wnl  - 24hr urine wnl  - continue daily ASA         Depression affecting pregnancy     On Cymbalta  - continue to monitor  - [facility]         History of  delivery Desires RCD               Dispo: RTC in 4 weeks, US at that time  Odalys Robledo MD

## 2021-01-14 PROBLEM — O44.40 LOW-LYING PLACENTA: Status: ACTIVE | Noted: 2021-01-14

## 2021-01-14 RX ORDER — LEVOTHYROXINE SODIUM 0.05 MG/1
50 TABLET ORAL DAILY
Qty: 30 TABLET | Refills: 2 | Status: SHIPPED | OUTPATIENT
Start: 2021-01-14

## 2021-01-14 NOTE — ASSESSMENT & PLAN NOTE
- FWB: reassuring by US today  - Genetic screening: MQS low risk  - Anatomy scan: done 1/13/21, suboptimal due to fetal positioning, low lying anterior placenta, normal CL and normal fluid, male fetus    - repeat US in 4 weeks  - Flu shot: discuss at next visit  - Tdap: plan at 28wks  - PNL: A+/ab-, RI, HepB neg, RPRnr, HIV neg, UDS negative, urine culture negative, GCCT/trich next, Hgb 13.2

## 2021-01-19 ENCOUNTER — TELEPHONE (OUTPATIENT)
Dept: OBGYN CLINIC | Age: 31
End: 2021-01-19

## 2021-01-19 RX ORDER — BREAST PUMP
1 EACH MISCELLANEOUS PRN
Qty: 1 EACH | Refills: 0 | Status: SHIPPED | OUTPATIENT
Start: 2021-01-19

## 2021-01-19 NOTE — TELEPHONE ENCOUNTER
Pt requesting generic prescription for Breast pump. She would like to  to send to her insurance. Pended generic script. Please sign or advise  Pt is aware we are able to send via [employer] but would prefer hand script for insurance.

## 2021-02-09 ENCOUNTER — TELEPHONE (OUTPATIENT)
Dept: OBGYN CLINIC | Age: 31
End: 2021-02-09

## 2021-02-10 ENCOUNTER — OFFICE VISIT (OUTPATIENT)
Dept: OBGYN CLINIC | Age: 31
End: 2021-02-10
Payer: OTHER GOVERNMENT

## 2021-02-10 ENCOUNTER — ROUTINE PRENATAL (OUTPATIENT)
Dept: OBGYN CLINIC | Age: 31
End: 2021-02-10

## 2021-02-10 VITALS
HEART RATE: 105 BPM | DIASTOLIC BLOOD PRESSURE: 86 MMHG | WEIGHT: 199 LBS | SYSTOLIC BLOOD PRESSURE: 130 MMHG | BODY MASS INDEX: 38.86 KG/M2

## 2021-02-10 DIAGNOSIS — Z87.59 HISTORY OF GESTATIONAL HYPERTENSION: ICD-10-CM

## 2021-02-10 DIAGNOSIS — F32.A DEPRESSION AFFECTING PREGNANCY: ICD-10-CM

## 2021-02-10 DIAGNOSIS — Z98.891 HISTORY OF CESAREAN DELIVERY: ICD-10-CM

## 2021-02-10 DIAGNOSIS — Z34.92 PRENATAL CARE IN SECOND TRIMESTER: ICD-10-CM

## 2021-02-10 DIAGNOSIS — E03.8 OTHER SPECIFIED HYPOTHYROIDISM: ICD-10-CM

## 2021-02-10 DIAGNOSIS — O44.40 LOW-LYING PLACENTA: Primary | ICD-10-CM

## 2021-02-10 DIAGNOSIS — O99.340 DEPRESSION AFFECTING PREGNANCY: ICD-10-CM

## 2021-02-10 DIAGNOSIS — O44.40 LOW-LYING PLACENTA: ICD-10-CM

## 2021-02-10 PROCEDURE — 0502F SUBSEQUENT PRENATAL CARE: CPT | Performed by: OBSTETRICS & GYNECOLOGY

## 2021-02-10 PROCEDURE — 76816 OB US FOLLOW-UP PER FETUS: CPT | Performed by: OBSTETRICS & GYNECOLOGY

## 2021-02-10 RX ORDER — ONDANSETRON 4 MG/1
4 TABLET, ORALLY DISINTEGRATING ORAL EVERY 8 HOURS PRN
Qty: 20 TABLET | Refills: 1 | Status: SHIPPED | OUTPATIENT
Start: 2021-02-10 | End: 2021-02-11

## 2021-02-10 NOTE — PROGRESS NOTES
Return OB Office Visit    CC:   Chief Complaint   Patient presents with    Routine Prenatal Visit       HPI:  Pt seen and examined. No concerns/complaints. Denies VB, LOF, ctx. +FM. Did notice some upper abdominal tightening x1 last week, has not recurred since then and only lasted a few minutes. Also has some cervix pain 1x/week since last visit. Maternal wellness questionnaire reviewed - no concerns today. Score 7.      Objective:  /86   Pulse 105   Wt 199 lb (90.3 kg)   LMP 2020   BMI 38.86 kg/m²   Gen: AO, NAD  Abd: Soft, NT  FHT: 161    Assessment/Plan:  31 y.o.  at 23w1d (Estimated Date of Delivery: 21) presents for PHILLIP appointment:     Problem List Items Addressed This Visit        Gynecology/Obstetric Problems    Prenatal care in second trimester     - FWB: reassuring by US today  - Genetic screening: S low risk  - Anatomy scan: done 21, suboptimal due to fetal positioning, low lying anterior placenta, normal CL and normal fluid, male fetus    - completion 2/10/21, wnl aside from suboptimal LVOT and sacral spine  - Tdap: plan at 28wks  - PNL: A+/ab-, RI, HepB neg, RPRnr, HIV neg, UDS negative, urine culture negative, GCCT/trich next, Hgb 13.2    - GTT/CBC next visit         Low-lying placenta     Now 2.02cm from os, reevaluate at next visit  - normal appearing placenta/uterine interface noted            Other    Hypothyroidism     Currently on synthroid 50mcg daily  - TSH  2.17  - will repeat at next visit         History of gestational hypertension     Reports prior gHTN in labor  - baseline CBC/CMP wnl  - 24hr urine wnl  - continue daily ASA         Depression affecting pregnancy     On Cymbalta  - continue to monitor  - [facility]         History of  delivery     Desires RCD               Dispo: RTC in 4 weeks, GTT/CBC, Tdap and US at that time  Deana Gooden MD

## 2021-02-10 NOTE — ASSESSMENT & PLAN NOTE
- FWB: reassuring by US today  - Genetic screening: MQS low risk  - Anatomy scan: done 1/13/21, suboptimal due to fetal positioning, low lying anterior placenta, normal CL and normal fluid, male fetus    - completion 2/10/21, wnl aside from suboptimal LVOT and sacral spine  - Tdap: plan at 28wks  - PNL: A+/ab-, RI, HepB neg, RPRnr, HIV neg, UDS negative, urine culture negative, GCCT/trich next, Hgb 13.2    - GTT/CBC next visit

## 2021-02-10 NOTE — PROGRESS NOTES
Temp-97. 2f infrared  Maternal emotional well being screening form completed and reviewed with patient. Current score is 7. Patient given referral to [facility] ([phone]):  No

## 2021-02-11 RX ORDER — ONDANSETRON 4 MG/1
4 TABLET, ORALLY DISINTEGRATING ORAL EVERY 8 HOURS PRN
Qty: 20 TABLET | Refills: 1 | Status: SHIPPED | OUTPATIENT
Start: 2021-02-11

## 2021-02-17 ENCOUNTER — TELEPHONE (OUTPATIENT)
Dept: OBGYN CLINIC | Age: 31
End: 2021-02-17

## 2021-02-17 NOTE — TELEPHONE ENCOUNTER
Patient called in and stated the pharmacy had not gotten her script for the Zofran. I called pharmacy and verified they had not received. Called in a verbal at that time for the zofran 4 mg ODT 1 tablet every 8 hours as needed for nausea, with one refill.

## 2021-03-01 ENCOUNTER — TELEPHONE (OUTPATIENT)
Dept: OBGYN CLINIC | Age: 31
End: 2021-03-01

## 2021-03-01 NOTE — TELEPHONE ENCOUNTER
miri called in and stated patient is getting bills and she is not supposed to be getting any. Stated she needs to be billed through [employer], as patient is getting bills and is not supposed to be.     stated he will fax over information with attention to Dr. Hamzah Guerrier. Routing to Catrina/Patrizia.

## 2021-03-02 NOTE — TELEPHONE ENCOUNTER
Spoke with Glynn Garcia. They show everything pending insurance. They do not show that patient has received a statement for her prenatal care. They did note the account with patient's referral number [id number]. They did not take the po box to mail claims to as they said they have it on file. Danielle Lazo with the [facility] to let him know that all dates of service going back to 8/2020 are pending insurance. Patient has not received a statement since 8/2020 from [facility]. Confirmed referral number with Jessica Young and he voiced understanding.

## 2021-03-09 ENCOUNTER — TELEPHONE (OUTPATIENT)
Dept: OBGYN CLINIC | Age: 31
End: 2021-03-09

## 2021-03-10 ENCOUNTER — ROUTINE PRENATAL (OUTPATIENT)
Dept: OBGYN CLINIC | Age: 31
End: 2021-03-10
Payer: OTHER GOVERNMENT

## 2021-03-10 ENCOUNTER — OFFICE VISIT (OUTPATIENT)
Dept: OBGYN CLINIC | Age: 31
End: 2021-03-10
Payer: OTHER GOVERNMENT

## 2021-03-10 VITALS
BODY MASS INDEX: 39.33 KG/M2 | DIASTOLIC BLOOD PRESSURE: 66 MMHG | SYSTOLIC BLOOD PRESSURE: 110 MMHG | HEART RATE: 106 BPM | WEIGHT: 201.4 LBS

## 2021-03-10 DIAGNOSIS — E03.8 OTHER SPECIFIED HYPOTHYROIDISM: ICD-10-CM

## 2021-03-10 DIAGNOSIS — Z34.92 PRENATAL CARE IN SECOND TRIMESTER: ICD-10-CM

## 2021-03-10 DIAGNOSIS — Z98.891 HISTORY OF CESAREAN DELIVERY: ICD-10-CM

## 2021-03-10 DIAGNOSIS — Z34.92 PRENATAL CARE IN SECOND TRIMESTER: Primary | ICD-10-CM

## 2021-03-10 DIAGNOSIS — Z87.59 HISTORY OF GESTATIONAL HYPERTENSION: ICD-10-CM

## 2021-03-10 DIAGNOSIS — O44.40 LOW-LYING PLACENTA: Primary | ICD-10-CM

## 2021-03-10 DIAGNOSIS — O44.40 LOW-LYING PLACENTA: ICD-10-CM

## 2021-03-10 DIAGNOSIS — F32.A DEPRESSION AFFECTING PREGNANCY: ICD-10-CM

## 2021-03-10 DIAGNOSIS — O99.340 DEPRESSION AFFECTING PREGNANCY: ICD-10-CM

## 2021-03-10 PROCEDURE — 36415 COLL VENOUS BLD VENIPUNCTURE: CPT | Performed by: OBSTETRICS & GYNECOLOGY

## 2021-03-10 PROCEDURE — 76816 OB US FOLLOW-UP PER FETUS: CPT | Performed by: OBSTETRICS & GYNECOLOGY

## 2021-03-10 PROCEDURE — 0502F SUBSEQUENT PRENATAL CARE: CPT | Performed by: OBSTETRICS & GYNECOLOGY

## 2021-03-10 NOTE — PROGRESS NOTES
Return OB Office Visit    CC:   Chief Complaint   Patient presents with    Routine Prenatal Visit       HPI:  Pt seen and examined. No concerns/complaints. Denies VB, LOF, ctx. +FM    Just tired. Has had a hernia repair in 2017, starting to get very uncomfortable at that area and feels a tearing feeling there at time. Did talk to her PCP about palpitations, has a Holter monitor to track it. Maternal wellness questionnaire reviewed - no concerns today. Score 9. Objective:  /66   Pulse 106   Wt 201 lb 6.4 oz (91.4 kg)   LMP 09/01/2020   BMI 39.33 kg/m²   Gen: AO, NAD  Abd: Soft, NT  FHT: 130    Ultrasound:  Impression   OBSTETRICAL ULTRASOUND GROWTH       DATE: 3/10/21       PHYSICIAN: Sherin Moran M.D.        SONOGRAPHER: WILMER Murphy RDMS       INDICATION: Growth, anatomy follow up, placenta check       TYPE OF SCAN: abdominal       FINDINGS:   A single viable intrauterine pregnancy is noted in cephalic presentation. Cardiac and somatic activity are noted.       The following values were obtained:   Fetal heart rate 130bpm   BPD 7.42cm 97.6 %   Head Circumference 27.17cm 92.0 %    Abdominal Circumference 25.12cm 93.9 %   Femur Length 5.49cm 85.3 %   Humerus Length 4.53cm 34.3 %   Amniotic fluid index 16.13cm   EFW 1365g >97 percentile       Amniotic fluid volume is normal. Based on sonographic criteria the estimated fetal age is 28weeks and 6days with EDC of 5/27/21. There is a 12 day discordance with the established EDC of 6/8/21.        The patient has an anterior placenta that is adequate distance in relation to the internal cervical os.  The evaluation of the lower uterine segment and cervix reveals normal appearing anatomy.  The uterus is unremarkable/gravid.  Maternal ovaries and adnexae are not well visualized due to the size of the uterus and patient's gravid state.       Anatomy seen includes: heart, stomach, kidneys, bladder, spine, LVOT       IMPRESSION:   Single live IUP in the second trimester. Adequate interval fetal growth.        Imaging is limited secondary to fetal position.    The patient is well aware of the limitations of ultrasound in the detection of anomalies.             Assessment/Plan:  36 y.o.  at 27w1d (Estimated Date of Delivery: 21) presents for PHILLIP appointment:     Problem List Items Addressed This Visit        Gynecology/Obstetric Problems    Prenatal care in second trimester - Primary     - FWB: reassuring by US today  - Genetic screening: MQS low risk  - Anatomy scan: done 21, suboptimal due to fetal positioning, low lying anterior placenta, normal CL and normal fluid, male fetus    - completion 2/10/21, wnl aside from suboptimal LVOT and sacral spine    - scan 3/11/21 completed  - Tdap: 3/10/21  - PNL: A+/ab-, RI, HepB neg, RPRnr, HIV neg, UDS negative, urine culture negative, GCCT/trich next, Hgb 13.2    - GTT/CBC today         Relevant Orders    GLUCOSE CHALLENGE GESTATIONAL (Completed)    CBC WITH AUTO DIFFERENTIAL (Completed)    Low-lying placenta     Now >2.7cm from os  - normal appearing placenta/uterine interface noted, discussed with patient today            Other    Hypothyroidism     Currently on synthroid 50mcg daily  - TSH  2.17  - repeated today         Relevant Orders    TSH without Reflex (Completed)    T4, FREE (Completed)    History of gestational hypertension     Reports prior gHTN in labor  - baseline CBC/CMP wnl  - 24hr urine wnl  - continue daily ASA         Depression affecting pregnancy     On Cymbalta  - continue to monitor  - [facility]         History of  delivery     Desires RCD, will schedule at next visit               Dispo: RTC in 2 weeks  Alan Sherwood MD

## 2021-03-10 NOTE — PROGRESS NOTES
10:56 AM Given Tdap (Adacel) vaccine 0.5mL IM  Site:Left deltoid. Lot #[id number]  Expiration Date: 10/07/2022  Viridiana Briggs 49 #[id number]. Patient tolerated well. No reaction noted after 20 minutes. VIS sheet provided.   Administered by: Angel Carreon

## 2021-03-10 NOTE — PROGRESS NOTES
Temp-98.3F infrared  Patient began drinking Glucose 50G at 0850. Finished drinking at 9879. No c/o n/v at this time. Will continue to monitor patient.

## 2021-03-11 LAB
BASOPHILS ABSOLUTE: 0 K/UL (ref 0–0.2)
BASOPHILS RELATIVE PERCENT: 0.4 %
EOSINOPHILS ABSOLUTE: 0.1 K/UL (ref 0–0.6)
EOSINOPHILS RELATIVE PERCENT: 0.9 %
GLUCOSE CHALLENGE: 118 MG/DL
HCT VFR BLD CALC: 33.5 % (ref 36–48)
HEMOGLOBIN: 11 G/DL (ref 12–16)
LYMPHOCYTES ABSOLUTE: 0.7 K/UL (ref 1–5.1)
LYMPHOCYTES RELATIVE PERCENT: 10.6 %
MCH RBC QN AUTO: 29.9 PG (ref 26–34)
MCHC RBC AUTO-ENTMCNC: 32.8 G/DL (ref 31–36)
MCV RBC AUTO: 91.2 FL (ref 80–100)
MONOCYTES ABSOLUTE: 0.4 K/UL (ref 0–1.3)
MONOCYTES RELATIVE PERCENT: 6.6 %
NEUTROPHILS ABSOLUTE: 5.5 K/UL (ref 1.7–7.7)
NEUTROPHILS RELATIVE PERCENT: 81.5 %
PDW BLD-RTO: 14.5 % (ref 12.4–15.4)
PLATELET # BLD: 173 K/UL (ref 135–450)
PMV BLD AUTO: 10.5 FL (ref 5–10.5)
RBC # BLD: 3.67 M/UL (ref 4–5.2)
T4 FREE: 0.9 NG/DL (ref 0.9–1.8)
TSH SERPL DL<=0.05 MIU/L-ACNC: 2.45 UIU/ML (ref 0.27–4.2)
WBC # BLD: 6.8 K/UL (ref 4–11)

## 2021-03-11 NOTE — ASSESSMENT & PLAN NOTE
- FWB: reassuring by US today  - Genetic screening: MQS low risk  - Anatomy scan: done 1/13/21, suboptimal due to fetal positioning, low lying anterior placenta, normal CL and normal fluid, male fetus    - completion 2/10/21, wnl aside from suboptimal LVOT and sacral spine    - scan 3/11/21 completed  - Tdap: 3/10/21  - PNL: A+/ab-, RI, HepB neg, RPRnr, HIV neg, UDS negative, urine culture negative, GCCT/trich next, Hgb 13.2    - GTT/CBC today

## 2021-03-11 NOTE — ASSESSMENT & PLAN NOTE
Now >2.7cm from os  - normal appearing placenta/uterine interface noted, discussed with patient today

## 2021-03-24 ENCOUNTER — OFFICE VISIT (OUTPATIENT)
Dept: OBGYN CLINIC | Age: 31
End: 2021-03-24
Payer: OTHER GOVERNMENT

## 2021-03-24 ENCOUNTER — ROUTINE PRENATAL (OUTPATIENT)
Dept: OBGYN CLINIC | Age: 31
End: 2021-03-24

## 2021-03-24 VITALS
BODY MASS INDEX: 39.87 KG/M2 | SYSTOLIC BLOOD PRESSURE: 126 MMHG | DIASTOLIC BLOOD PRESSURE: 80 MMHG | HEART RATE: 106 BPM | WEIGHT: 204.13 LBS

## 2021-03-24 DIAGNOSIS — O44.40 LOW-LYING PLACENTA: ICD-10-CM

## 2021-03-24 DIAGNOSIS — O99.340 DEPRESSION AFFECTING PREGNANCY: ICD-10-CM

## 2021-03-24 DIAGNOSIS — E03.9 HYPOTHYROIDISM, UNSPECIFIED TYPE: ICD-10-CM

## 2021-03-24 DIAGNOSIS — Z87.59 HISTORY OF GESTATIONAL HYPERTENSION: ICD-10-CM

## 2021-03-24 DIAGNOSIS — Z34.93 PRENATAL CARE IN THIRD TRIMESTER: Primary | ICD-10-CM

## 2021-03-24 DIAGNOSIS — Z34.92 PRENATAL CARE IN SECOND TRIMESTER: Primary | ICD-10-CM

## 2021-03-24 DIAGNOSIS — Z98.891 HISTORY OF CESAREAN DELIVERY: ICD-10-CM

## 2021-03-24 DIAGNOSIS — F32.A DEPRESSION AFFECTING PREGNANCY: ICD-10-CM

## 2021-03-24 PROCEDURE — 76815 OB US LIMITED FETUS(S): CPT | Performed by: OBSTETRICS & GYNECOLOGY

## 2021-03-24 PROCEDURE — 0502F SUBSEQUENT PRENATAL CARE: CPT | Performed by: OBSTETRICS & GYNECOLOGY

## 2021-03-24 NOTE — PROGRESS NOTES
Return OB Office Visit    CC:   Chief Complaint   Patient presents with    Routine Prenatal Visit       HPI:  Pt seen and examined. No concerns/complaints. Denies VB, LOF, ctx. +FM. Wearing monitor for previous episodes of tachycardia, denies any recent episodes. Maternal wellness questionnaire reviewed - no concerns today. Score 10.      Objective:  /80   Pulse 106   Wt 204 lb 2 oz (92.6 kg)   LMP 2020   BMI 39.87 kg/m²   Gen: AO, NAD  Abd: Soft, NT  FHT: 164    Assessment/Plan:  36 y.o.  at 29w1d (Estimated Date of Delivery: 21) presents for PHILLIP appointment:     Problem List Items Addressed This Visit        Gynecology/Obstetric Problems    Prenatal care in third trimester - Primary     - FWB: reassuring by US today  - Genetic screening: MQS low risk  - Anatomy scan: done 21, suboptimal due to fetal positioning, low lying anterior placenta, normal CL and normal fluid, male fetus    - completion 2/10/21, wnl aside from suboptimal LVOT and sacral spine    - scan 3/11/21 completed  - Tdap: 3/10/21  - PNL: A+/ab-, RI, HepB neg, RPRnr, HIV neg, UDS negative, urine culture negative, GCCT/trich next, Hgb 13.2    - 1hr , Hgb 11.0, plt 173         Low-lying placenta     Resolved, now >2.7cm from os  - normal appearing placenta/uterine interface             Other    Hypothyroidism     Currently on synthroid 50mcg daily  - TSH  2.17  - TSH 3/11 2.45, T4 0.9         History of gestational hypertension     Reports prior gHTN in labor  - baseline CBC/CMP wnl  - 24hr urine wnl  - continue daily ASA         Depression affecting pregnancy     On Cymbalta  - continue to monitor  - [facility]         History of  delivery     RCD scheduled 21 at 0800               Dispo: RTC in 2 weeks  Mena Victor MD

## 2021-03-24 NOTE — PROGRESS NOTES
Temp 98 F infrared  Maternal emotional well being screening form completed and reviewed with patient. Current score is 10. Patient given referral to [facility] ([phone]):  No

## 2021-03-25 NOTE — ASSESSMENT & PLAN NOTE
- FWB: reassuring by US today  - Genetic screening: MQS low risk  - Anatomy scan: done 1/13/21, suboptimal due to fetal positioning, low lying anterior placenta, normal CL and normal fluid, male fetus    - completion 2/10/21, wnl aside from suboptimal LVOT and sacral spine    - scan 3/11/21 completed  - Tdap: 3/10/21  - PNL: A+/ab-, RI, HepB neg, RPRnr, HIV neg, UDS negative, urine culture negative, GCCT/trich next, Hgb 13.2    - 1hr , Hgb 11.0, plt 173

## 2021-04-08 ENCOUNTER — ROUTINE PRENATAL (OUTPATIENT)
Dept: OBGYN CLINIC | Age: 31
End: 2021-04-08

## 2021-04-08 VITALS
BODY MASS INDEX: 39.41 KG/M2 | HEART RATE: 122 BPM | DIASTOLIC BLOOD PRESSURE: 70 MMHG | WEIGHT: 201.8 LBS | SYSTOLIC BLOOD PRESSURE: 124 MMHG

## 2021-04-08 DIAGNOSIS — F32.A DEPRESSION AFFECTING PREGNANCY: ICD-10-CM

## 2021-04-08 DIAGNOSIS — O44.40 LOW-LYING PLACENTA: ICD-10-CM

## 2021-04-08 DIAGNOSIS — Z87.59 HISTORY OF GESTATIONAL HYPERTENSION: ICD-10-CM

## 2021-04-08 DIAGNOSIS — Z98.891 HISTORY OF CESAREAN DELIVERY: ICD-10-CM

## 2021-04-08 DIAGNOSIS — O99.340 DEPRESSION AFFECTING PREGNANCY: ICD-10-CM

## 2021-04-08 DIAGNOSIS — E03.8 OTHER SPECIFIED HYPOTHYROIDISM: ICD-10-CM

## 2021-04-08 DIAGNOSIS — Z34.93 PRENATAL CARE IN THIRD TRIMESTER: Primary | ICD-10-CM

## 2021-04-08 PROCEDURE — 0502F SUBSEQUENT PRENATAL CARE: CPT | Performed by: OBSTETRICS & GYNECOLOGY

## 2021-04-09 ENCOUNTER — TELEPHONE (OUTPATIENT)
Dept: OBGYN CLINIC | Age: 31
End: 2021-04-09

## 2021-04-09 ENCOUNTER — HOSPITAL ENCOUNTER (OUTPATIENT)
Age: 31
Setting detail: OBSERVATION
Discharge: HOME | End: 2021-04-10
Attending: OBSTETRICS & GYNECOLOGY | Admitting: OBSTETRICS & GYNECOLOGY
Payer: OTHER GOVERNMENT

## 2021-04-09 PROBLEM — R00.0 TACHYCARDIA: Status: ACTIVE | Noted: 2021-04-09

## 2021-04-09 PROBLEM — R94.31 ABNORMAL EKG: Status: ACTIVE | Noted: 2021-04-09

## 2021-04-09 PROBLEM — Z34.83 PRENATAL CARE, SUBSEQUENT PREGNANCY IN THIRD TRIMESTER: Status: ACTIVE | Noted: 2020-10-26

## 2021-04-09 LAB
A/G RATIO: 1.2 (ref 1.1–2.2)
ALBUMIN SERPL-MCNC: 3.3 G/DL (ref 3.4–5)
ALP BLD-CCNC: 104 U/L (ref 40–129)
ALT SERPL-CCNC: 16 U/L (ref 10–40)
ANION GAP SERPL CALCULATED.3IONS-SCNC: 10 MMOL/L (ref 3–16)
AST SERPL-CCNC: 27 U/L (ref 15–37)
BASOPHILS ABSOLUTE: 0 K/UL (ref 0–0.2)
BASOPHILS RELATIVE PERCENT: 0.3 %
BILIRUB SERPL-MCNC: <0.2 MG/DL (ref 0–1)
BUN BLDV-MCNC: 6 MG/DL (ref 7–20)
CALCIUM SERPL-MCNC: 9.1 MG/DL (ref 8.3–10.6)
CHLORIDE BLD-SCNC: 100 MMOL/L (ref 99–110)
CO2: 21 MMOL/L (ref 21–32)
CREAT SERPL-MCNC: <0.5 MG/DL (ref 0.6–1.1)
EOSINOPHILS ABSOLUTE: 0.1 K/UL (ref 0–0.6)
EOSINOPHILS RELATIVE PERCENT: 1.5 %
GFR AFRICAN AMERICAN: >60
GFR NON-AFRICAN AMERICAN: >60
GLOBULIN: 2.8 G/DL
GLUCOSE BLD-MCNC: 142 MG/DL (ref 70–99)
HCT VFR BLD CALC: 36 % (ref 36–48)
HEMOGLOBIN: 12 G/DL (ref 12–16)
LYMPHOCYTES ABSOLUTE: 1.2 K/UL (ref 1–5.1)
LYMPHOCYTES RELATIVE PERCENT: 12.9 %
MCH RBC QN AUTO: 29.5 PG (ref 26–34)
MCHC RBC AUTO-ENTMCNC: 33.2 G/DL (ref 31–36)
MCV RBC AUTO: 88.7 FL (ref 80–100)
MONOCYTES ABSOLUTE: 1.1 K/UL (ref 0–1.3)
MONOCYTES RELATIVE PERCENT: 11.9 %
NEUTROPHILS ABSOLUTE: 6.5 K/UL (ref 1.7–7.7)
NEUTROPHILS RELATIVE PERCENT: 73.4 %
PDW BLD-RTO: 15 % (ref 12.4–15.4)
PLATELET # BLD: 200 K/UL (ref 135–450)
PMV BLD AUTO: 10.6 FL (ref 5–10.5)
POTASSIUM SERPL-SCNC: 3.9 MMOL/L (ref 3.5–5.1)
RBC # BLD: 4.06 M/UL (ref 4–5.2)
SODIUM BLD-SCNC: 131 MMOL/L (ref 136–145)
TOTAL PROTEIN: 6.1 G/DL (ref 6.4–8.2)
WBC # BLD: 8.9 K/UL (ref 4–11)

## 2021-04-09 PROCEDURE — 6370000000 HC RX 637 (ALT 250 FOR IP): Performed by: OBSTETRICS & GYNECOLOGY

## 2021-04-09 PROCEDURE — 96361 HYDRATE IV INFUSION ADD-ON: CPT

## 2021-04-09 PROCEDURE — 85025 COMPLETE CBC W/AUTO DIFF WBC: CPT

## 2021-04-09 PROCEDURE — G0378 HOSPITAL OBSERVATION PER HR: HCPCS

## 2021-04-09 PROCEDURE — 2580000003 HC RX 258: Performed by: INTERNAL MEDICINE

## 2021-04-09 PROCEDURE — 99214 OFFICE O/P EST MOD 30 MIN: CPT | Performed by: OBSTETRICS & GYNECOLOGY

## 2021-04-09 PROCEDURE — 6370000000 HC RX 637 (ALT 250 FOR IP): Performed by: INTERNAL MEDICINE

## 2021-04-09 PROCEDURE — 80053 COMPREHEN METABOLIC PANEL: CPT

## 2021-04-09 PROCEDURE — 99243 OFF/OP CNSLTJ NEW/EST LOW 30: CPT | Performed by: INTERNAL MEDICINE

## 2021-04-09 PROCEDURE — 93005 ELECTROCARDIOGRAM TRACING: CPT | Performed by: OBSTETRICS & GYNECOLOGY

## 2021-04-09 PROCEDURE — 96360 HYDRATION IV INFUSION INIT: CPT

## 2021-04-09 RX ORDER — LABETALOL 100 MG/1
50 TABLET, FILM COATED ORAL EVERY 12 HOURS SCHEDULED
Status: DISCONTINUED | OUTPATIENT
Start: 2021-04-09 | End: 2021-04-10 | Stop reason: HOSPADM

## 2021-04-09 RX ORDER — ONDANSETRON 2 MG/ML
4 INJECTION INTRAMUSCULAR; INTRAVENOUS EVERY 6 HOURS PRN
Status: DISCONTINUED | OUTPATIENT
Start: 2021-04-09 | End: 2021-04-10 | Stop reason: HOSPADM

## 2021-04-09 RX ORDER — ACETAMINOPHEN 325 MG/1
650 TABLET ORAL EVERY 4 HOURS PRN
Status: DISCONTINUED | OUTPATIENT
Start: 2021-04-09 | End: 2021-04-10 | Stop reason: HOSPADM

## 2021-04-09 RX ORDER — SODIUM CHLORIDE, SODIUM LACTATE, POTASSIUM CHLORIDE, CALCIUM CHLORIDE 600; 310; 30; 20 MG/100ML; MG/100ML; MG/100ML; MG/100ML
INJECTION, SOLUTION INTRAVENOUS CONTINUOUS
Status: DISCONTINUED | OUTPATIENT
Start: 2021-04-09 | End: 2021-04-10 | Stop reason: HOSPADM

## 2021-04-09 RX ORDER — PROMETHAZINE HYDROCHLORIDE 25 MG/1
12.5 TABLET ORAL EVERY 6 HOURS PRN
Status: DISCONTINUED | OUTPATIENT
Start: 2021-04-09 | End: 2021-04-10 | Stop reason: HOSPADM

## 2021-04-09 RX ORDER — LEVOTHYROXINE SODIUM 0.03 MG/1
50 TABLET ORAL DAILY
Status: DISCONTINUED | OUTPATIENT
Start: 2021-04-09 | End: 2021-04-10 | Stop reason: HOSPADM

## 2021-04-09 RX ORDER — DULOXETIN HYDROCHLORIDE 30 MG/1
30 CAPSULE, DELAYED RELEASE ORAL DAILY
Status: DISCONTINUED | OUTPATIENT
Start: 2021-04-09 | End: 2021-04-10 | Stop reason: HOSPADM

## 2021-04-09 RX ADMIN — LABETALOL HYDROCHLORIDE 50 MG: 100 TABLET, FILM COATED ORAL at 20:47

## 2021-04-09 RX ADMIN — SODIUM CHLORIDE, POTASSIUM CHLORIDE, SODIUM LACTATE AND CALCIUM CHLORIDE: 600; 310; 30; 20 INJECTION, SOLUTION INTRAVENOUS at 16:10

## 2021-04-09 RX ADMIN — LEVOTHYROXINE SODIUM 50 MCG: 0.03 TABLET ORAL at 20:47

## 2021-04-09 RX ADMIN — DULOXETINE HYDROCHLORIDE 30 MG: 30 CAPSULE, DELAYED RELEASE ORAL at 20:47

## 2021-04-09 RX ADMIN — ACETAMINOPHEN 650 MG: 325 TABLET ORAL at 20:47

## 2021-04-09 RX ADMIN — SODIUM CHLORIDE, POTASSIUM CHLORIDE, SODIUM LACTATE AND CALCIUM CHLORIDE: 600; 310; 30; 20 INJECTION, SOLUTION INTRAVENOUS at 23:53

## 2021-04-09 ASSESSMENT — PAIN SCALES - GENERAL: PAINLEVEL_OUTOF10: 3

## 2021-04-09 NOTE — PROGRESS NOTES
Patient arrives to [facility] triage with complaints of increased heart rate, tightness in chest, and palpitations. Patient states this all started around 11:00AM today. Patient was previously on heart monitor for palpitations and was discontinued yesterday since no episodes were present when being monitored.

## 2021-04-09 NOTE — ASSESSMENT & PLAN NOTE
- FWB: reassuring by doptones today  - Genetic screening: MQS low risk  - Anatomy scan: done 1/13/21, suboptimal due to fetal positioning, low lying anterior placenta, normal CL and normal fluid, male fetus    - completion 2/10/21, wnl aside from suboptimal LVOT and sacral spine    - scan 3/11/21 completed  - Tdap: 3/10/21  - PNL: A+/ab-, RI, HepB neg, RPRnr, HIV neg, UDS negative, urine culture negative, GCCT/trich next, Hgb 13.2    - 1hr , Hgb 11.0, plt 173

## 2021-04-09 NOTE — CONSULTS
Hypothyroid, Postpartum depression, and PTSD (post-traumatic stress disorder). Surgical History:   has a past surgical history that includes  section; Umbilical hernia repair; and Bone Marrow Mertens (). Social History:   reports that she has never smoked. She has never used smokeless tobacco. She reports previous alcohol use of about 1.0 standard drinks of alcohol per week. She reports that she does not use drugs. Family History:  No evidence for sudden cardiac death or premature CAD    Medications:  Reviewed and are listed in nursing record. and/or listed below  Outpatient Medications:  Prior to Admission medications    Medication Sig Start Date End Date Taking? Authorizing Provider   levothyroxine (SYNTHROID) 50 MCG tablet Take 1 tablet by mouth Daily 21  Yes Debbie Watson MD   Prenatal Multivit-Min-Fe-FA (PRE- PO) Take by mouth   Yes Historical Provider, MD   budesonide-formoterol (SYMBICORT) 160-4.5 MCG/ACT AERO Inhale 2 puffs into the lungs 2 times daily   Yes Historical Provider, MD   DULoxetine (CYMBALTA) 30 MG extended release capsule Take 30 mg by mouth daily   Yes Historical Provider, MD   ondansetron (ZOFRAN-ODT) 4 MG disintegrating tablet Take 1 tablet by mouth every 8 hours as needed for Nausea 21   Debbie Watson MD   Misc.  Devices (BREAST PUMP) [facility] 1 Device by Does not apply route as needed (as needed) Breast pump 21   Debbie Watson MD   aspirin (ASPIRIN 81) 81 MG EC tablet Take 1 tablet by mouth daily 20   Debbie Watson MD   pyridoxine (B-6) 25 MG tablet Take 1 tablet by mouth 2 times daily as needed (nausea)  Patient not taking: Reported on 2/10/2021 10/26/20   Debbie Watson MD   doxyLAMINE succinate (GNP SLEEP AID) 25 MG tablet Take 1 tablet by mouth nightly as needed for Sleep (nausea)  Patient not taking: Reported on 2/10/2021 10/26/20   Debbie Watson MD   albuterol (VOSPIRE ER) 4 MG extended release tablet Take 4 mg by mouth every 12 hours PRN Historical Provider, MD       In-patient schedule medications:   labetalol  50 mg Oral 2 times per day         Infusion Medications:   lactated ringers 125 mL/hr at 04/09/21 1610         Allergies:  Bactrim [sulfamethoxazole-trimethoprim]     Review of Systems:   All 14 point review of symptoms completed. Pertinent positives identified in the HPI, all other review of symptoms findings as below. Physical Examination:    [unfilled]  /69   Pulse 122   Temp 98.2 °F (36.8 °C) (Oral)   Resp 16   Ht 5' (1.524 m)   Wt 200 lb (90.7 kg)   LMP 09/01/2020   SpO2 98%   BMI 39.06 kg/m²    Weight: 200 lb (90.7 kg)     Wt Readings from Last 3 Encounters:   04/09/21 200 lb (90.7 kg)   04/08/21 201 lb 12.8 oz (91.5 kg)   03/24/21 204 lb 2 oz (92.6 kg)     No intake or output data in the 24 hours ending 04/09/21 1720    General Appearance:  Alert, cooperative, no distress, appears stated age   Head:  Normocephalic, without obvious abnormality, atraumatic   Eyes:  PERRL, conjunctiva/corneas clear       Nose: Nares normal, no drainage or sinus tenderness   Throat: Lips, mucosa, and tongue normal   Neck: Supple, symmetrical, trachea midline, no adenopathy, thyroid: not enlarged, symmetric, no tenderness/mass/nodules, no carotid bruit or JVD       Lungs:   Clear to auscultation bilaterally, respirations unlabored   Chest Wall:  No tenderness or deformity   Heart:  Regular rhythm and fast rate   Abdomen:   Soft, pregnant.            Extremities: Extremities normal, atraumatic, no cyanosis or edema   Pulses: 2+ and symmetric   Skin: Skin color, texture, turgor normal, no rashes or lesions   Pysch: Normal mood and affect   Neurologic: Normal gross motor and sensory exam.         Labs  Recent Labs     04/09/21  1550   WBC 8.9   HGB 12.0   HCT 36.0   MCV 88.7        Recent Labs     04/09/21  1550   CREATININE <0.5*   BUN 6*   *   K 3.9      CO2 21     No results for input(s): INR, PROTIME in the last 72 hours. No results for input(s): TROPONINI in the last 72 hours. Invalid input(s): PRO-BNP  No results for input(s): CHOL, HDL in the last 72 hours. Invalid input(s): LDL, TG      Imaging:  I have reviewed the below testing personally and my interpretation is below. EKG: sinus tachycardia  CXR:      Assessment:  36 y.o. patient with:  Active Problems:    Abnormal EKG    Tachycardia  Resolved Problems:    * No resolved hospital problems. *      Plan:  1. Likely had SVT but uncertain   2. Recommend IV hydration   3. Echocardiogram  4. Labetalol oral if not contraindicated from OB team  5. OP monitor  6. TSH level  7. Mild anemia could aggravate condition. All questions and concerns were addressed to the patient/family. Alternatives to my treatment were discussed. The note was completed using EMR. Every effort was made to ensure accuracy; however, inadvertent computerized transcription errors may be present.     Yu Nicole MD, [address], [place] [id number] [facility]  [phone] Main central  4/9/2021  5:20 PM

## 2021-04-09 NOTE — TELEPHONE ENCOUNTER
Called the [place] and spoke to Pedro to start the process to get mental health services covered. Per Pedro the patient will have to reach out to her [place] PCP first to start the referral process. If they do not have anyone that specializes in prenatal care/post partum care they will call to get the name of the provider Dr. Kyree Hamilton wants patient to see. Patient aware and will call [facility] with [facility] to [facility]. Routing to Dr. Kyree Hamilton as Brandye Gathers.

## 2021-04-09 NOTE — PROGRESS NOTES
Dr. Blaze Lawler returned RN page. Plan for patient to receive stat 12 lead EKG and cardiology to be consulted. RN informed DO that IV was initiated. No other orders received at this time.

## 2021-04-09 NOTE — PROGRESS NOTES
2 Liters of O2 placed on patient via nasal canula by INEZ Stevenson RN related to patient O2 sat reading 93% and patient appearing diaphoretic.

## 2021-04-09 NOTE — ASSESSMENT & PLAN NOTE
On Cymbalta  - continue to monitor  - [facility], looking for possible referral from us.  Will look into coverage for appt with Parvez Lunsford

## 2021-04-09 NOTE — PROGRESS NOTES
Patient placed on telemetry monitor at this time. Ultrasound adjusted for EFM. Loss of fix on EFM related to maternal habitus as patient sits up to eat dinner. Fetal heart rate reassuring when auscultated, fetus very active per mother and audibly heard by RN on ultrasound.

## 2021-04-09 NOTE — TELEPHONE ENCOUNTER
Patient called in and stated she has had a HR of 140- 150 for the last 3 hrs, stated she is drinking a lot of fluids, and is still very thirsty, she is peeing a lot as well. Stated she feels she is having some tightening (irregular) stated she is also noticing palpitations with the fast HR. Denies chest pains, slight headache noted, with some dizziness noted as well. Please advise. Patient stated this is worse than the last time and is new. Was advised to present to L&D. Placed call and spoke with Temo Orozco on L&D. Made aware.      Routing to Dr. Daniele Gaffney( On call)

## 2021-04-09 NOTE — ASSESSMENT & PLAN NOTE
Currently on synthroid 50mcg daily  - TSH 12/18 2.17  - TSH 3/11 2.45, T4 0.9  - recheck at 36 weeks

## 2021-04-09 NOTE — H&P
Department of Obstetrics and Gynecology  Attending Obstetrics History and Physical        CHIEF COMPLAINT:  tachycardia    HISTORY OF PRESENT ILLNESS:      The patient is a 36 y.o.  2 para 1001 at 31 weeks 3 days gestation with [facility] 2021 who presented to triage for evaluation secondary to elevated HR. HR at home was ranging from 140-150 x 3 hrs with palpitations. Upon arrival to triage HR found in the 190's. Tachycardia resolved with vagal maneuvers after 30 minutes of monitoring. Patient with recent history of palpitations and tachycardia--completed cardiac monitoring this week. Denies history of tachycardia and palpitations prior to pregnancy. Denies chest pain. Admits to some tightening and mild shortness of breath. Denies family history of cardiac disease. Has noted mild headache and some nausea. Denies fever, chills, diarrhea, constipation, dysuria and hematuria. Admits to fetal movement. Denies contractions, vaginal bleeding and loss of fluid. Patient presents with a chief complaint as above and is being admitted for further monitoring and cardiac evaluation. Pregnancy is complicated by tachycardia, hypothyroid, history of gestational HTN, history of depression, mild asthma, history of postpartum hemorrhage, and history of . DATES:    Last Menstrual Period:  2020  Estimated Due Date:  2021    PRENATAL CARE:    Provider:  MARICARMEN Ragland D.O.     Blood Type/Rh:  A positive  Antibody Screen:  negative  Rubella:  immune  RPR:  Non-reactive  Hepatitis B Surface Antigen: non-reactive  HIV:  Non-reactive  Gonorrhea:  negative  Chlamydia:  Negative  MSAFP/Multiple Markers:  Date:  ; Results:    U/S Structural Survery:  See report  1 hour Glucose Tolerance Test:  118  Group B Strep:  unknown      PAST OB HISTORY        Depression:  Yes PTSD      Post-partum depression:  Yes       Diabetes:  No      Gestational Diabetes:  No      Thyroid Disease:  Yes       Chronic HTN:  No extended release capsule, Take 30 mg by mouth daily  ondansetron (ZOFRAN-ODT) 4 MG disintegrating tablet, Take 1 tablet by mouth every 8 hours as needed for Nausea  Misc. Devices (BREAST PUMP) MISC, 1 Device by Does not apply route as needed (as needed) Breast pump  aspirin (ASPIRIN 81) 81 MG EC tablet, Take 1 tablet by mouth daily  pyridoxine (B-6) 25 MG tablet, Take 1 tablet by mouth 2 times daily as needed (nausea) (Patient not taking: Reported on 2/10/2021)  doxyLAMINE succinate (GNP SLEEP AID) 25 MG tablet, Take 1 tablet by mouth nightly as needed for Sleep (nausea) (Patient not taking: Reported on 2/10/2021)  albuterol (VOSPIRE ER) 4 MG extended release tablet, Take 4 mg by mouth every 12 hours PRN  Allergies:  Bactrim [sulfamethoxazole-trimethoprim]    REVIEW OF SYSTEMS:    Patient has a history of depression .   Patient has no symptoms of depression  CONSTITUTIONAL:  negative for  fevers, chills, sweats and fatigue  RESPIRATORY:  positive for  Dyspnea--mild  CARDIOVASCULAR:  positive for  palpitations, fatigue  negative for  chest pain  GASTROINTESTINAL:  positive for nausea  negative for vomiting, diarrhea, constipation and abdominal pain  GENITOURINARY:  negative for dysuria and hematuria  INTEGUMENT/BREAST:  negative  ENDOCRINE:  negative  MUSCULOSKELETAL:  negative  NEUROLOGICAL:  positive for headaches  negative for dizziness  BEHAVIOR/PSYCH:  negative    PHYSICAL EXAM:  Vitals:    04/09/21 2035   BP: 120/67   Pulse: 104   Resp:    Temp:    SpO2:        General appearance:  awake, alert, cooperative, no apparent distress, and appears stated age  Neurologic:  Mental Status Exam:  Level of Alertness:   awake  Orientation:   person, place, time  Memory:   normal  Fund of Knowledge:  normal  Attention/Concentration:  normal  Language:  normal  Lungs:  No increased work of breathing, good air exchange, clear to auscultation bilaterally, no crackles or wheezing  Heart:  normal S1 and S2  Abdomen:  soft, non-distended and non-tender  Fetal heart rate:  Baseline Heart Rate 130, accelerations:  present  long term variability:  moderate  decelerations:  absent  Contraction frequency:  No contractions  Membranes:  Intact    General Labs:    CBC:   Lab Results   Component Value Date    WBC 8.9 2021    RBC 4.06 2021    HGB 12.0 2021    HCT 36.0 2021    MCV 88.7 2021    RDW 15.0 2021     2021     CMP:    Lab Results   Component Value Date     2021    K 3.9 2021     2021    CO2 21 2021    BUN 6 2021    PROT 6.1 2021       ASSESSMENT AND PLAN:    1. The patient is a 36 y.o.  2 parity 1001 at 31 weeks 3 days gestation  2. Tachycardia, palpitations, abnormal EKG  3. Hypothyroid  4. Previous . 5. Asthma  6. History of gestational HTN  7. History of postpartum hemorrhage. 8. History of depression    Plan:   23 hour observation  Maternal echocardiogram planned for AM  Fetal monitoring  Repeat labs in AM:  CBC, CMP, TSH, free T4  See orders. Appreciate input from Cardiology. Paloma Ragland D.O.

## 2021-04-09 NOTE — PLAN OF CARE
Problem: Falls - Risk of:  Goal: Will remain free from falls  Description: Will remain free from falls  Outcome: Ongoing  Goal: Absence of physical injury  Description: Absence of physical injury  Outcome: Ongoing     Problem: Healthcare acquired conditions:  Goal: Absence of healthcare acquired conditions  Description: Absence of healthcare acquired conditions  Outcome: Ongoing

## 2021-04-10 VITALS
OXYGEN SATURATION: 97 % | DIASTOLIC BLOOD PRESSURE: 71 MMHG | WEIGHT: 200 LBS | RESPIRATION RATE: 16 BRPM | HEIGHT: 60 IN | SYSTOLIC BLOOD PRESSURE: 112 MMHG | HEART RATE: 86 BPM | BODY MASS INDEX: 39.27 KG/M2 | TEMPERATURE: 97.9 F

## 2021-04-10 LAB
A/G RATIO: 1.2 (ref 1.1–2.2)
ALBUMIN SERPL-MCNC: 2.9 G/DL (ref 3.4–5)
ALP BLD-CCNC: 93 U/L (ref 40–129)
ALT SERPL-CCNC: 13 U/L (ref 10–40)
ANION GAP SERPL CALCULATED.3IONS-SCNC: 7 MMOL/L (ref 3–16)
AST SERPL-CCNC: 19 U/L (ref 15–37)
BASOPHILS ABSOLUTE: 0 K/UL (ref 0–0.2)
BASOPHILS RELATIVE PERCENT: 0.4 %
BILIRUB SERPL-MCNC: <0.2 MG/DL (ref 0–1)
BUN BLDV-MCNC: 8 MG/DL (ref 7–20)
CALCIUM SERPL-MCNC: 8.4 MG/DL (ref 8.3–10.6)
CHLORIDE BLD-SCNC: 102 MMOL/L (ref 99–110)
CO2: 25 MMOL/L (ref 21–32)
CREAT SERPL-MCNC: <0.5 MG/DL (ref 0.6–1.1)
EKG ATRIAL RATE: 127 BPM
EKG ATRIAL RATE: 89 BPM
EKG DIAGNOSIS: NORMAL
EKG DIAGNOSIS: NORMAL
EKG P AXIS: 54 DEGREES
EKG P AXIS: 64 DEGREES
EKG P-R INTERVAL: 120 MS
EKG P-R INTERVAL: 136 MS
EKG Q-T INTERVAL: 310 MS
EKG Q-T INTERVAL: 372 MS
EKG QRS DURATION: 82 MS
EKG QRS DURATION: 96 MS
EKG QTC CALCULATION (BAZETT): 450 MS
EKG QTC CALCULATION (BAZETT): 452 MS
EKG R AXIS: 58 DEGREES
EKG R AXIS: 83 DEGREES
EKG T AXIS: 11 DEGREES
EKG T AXIS: 34 DEGREES
EKG VENTRICULAR RATE: 127 BPM
EKG VENTRICULAR RATE: 89 BPM
EOSINOPHILS ABSOLUTE: 0.2 K/UL (ref 0–0.6)
EOSINOPHILS RELATIVE PERCENT: 2.4 %
GFR AFRICAN AMERICAN: >60
GFR NON-AFRICAN AMERICAN: >60
GLOBULIN: 2.5 G/DL
GLUCOSE BLD-MCNC: 89 MG/DL (ref 70–99)
HCT VFR BLD CALC: 31.6 % (ref 36–48)
HEMOGLOBIN: 10.6 G/DL (ref 12–16)
LV EF: 55 %
LVEF MODALITY: NORMAL
LYMPHOCYTES ABSOLUTE: 1.1 K/UL (ref 1–5.1)
LYMPHOCYTES RELATIVE PERCENT: 16 %
MCH RBC QN AUTO: 30.2 PG (ref 26–34)
MCHC RBC AUTO-ENTMCNC: 33.6 G/DL (ref 31–36)
MCV RBC AUTO: 89.9 FL (ref 80–100)
MONOCYTES ABSOLUTE: 0.8 K/UL (ref 0–1.3)
MONOCYTES RELATIVE PERCENT: 10.9 %
NEUTROPHILS ABSOLUTE: 4.9 K/UL (ref 1.7–7.7)
NEUTROPHILS RELATIVE PERCENT: 70.3 %
PDW BLD-RTO: 15.1 % (ref 12.4–15.4)
PLATELET # BLD: 149 K/UL (ref 135–450)
PMV BLD AUTO: 10.2 FL (ref 5–10.5)
POTASSIUM REFLEX MAGNESIUM: 3.8 MMOL/L (ref 3.5–5.1)
RBC # BLD: 3.51 M/UL (ref 4–5.2)
SODIUM BLD-SCNC: 134 MMOL/L (ref 136–145)
T4 FREE: 1 NG/DL (ref 0.9–1.8)
TOTAL PROTEIN: 5.4 G/DL (ref 6.4–8.2)
TSH SERPL DL<=0.05 MIU/L-ACNC: 2.48 UIU/ML (ref 0.27–4.2)
WBC # BLD: 7 K/UL (ref 4–11)

## 2021-04-10 PROCEDURE — 80053 COMPREHEN METABOLIC PANEL: CPT

## 2021-04-10 PROCEDURE — 84443 ASSAY THYROID STIM HORMONE: CPT

## 2021-04-10 PROCEDURE — 36415 COLL VENOUS BLD VENIPUNCTURE: CPT

## 2021-04-10 PROCEDURE — 99217 PR OBSERVATION CARE DISCHARGE MANAGEMENT: CPT | Performed by: OBSTETRICS & GYNECOLOGY

## 2021-04-10 PROCEDURE — 6370000000 HC RX 637 (ALT 250 FOR IP): Performed by: INTERNAL MEDICINE

## 2021-04-10 PROCEDURE — 93005 ELECTROCARDIOGRAM TRACING: CPT | Performed by: INTERNAL MEDICINE

## 2021-04-10 PROCEDURE — G0378 HOSPITAL OBSERVATION PER HR: HCPCS

## 2021-04-10 PROCEDURE — 99214 OFFICE O/P EST MOD 30 MIN: CPT | Performed by: INTERNAL MEDICINE

## 2021-04-10 PROCEDURE — 84439 ASSAY OF FREE THYROXINE: CPT

## 2021-04-10 PROCEDURE — 96361 HYDRATE IV INFUSION ADD-ON: CPT

## 2021-04-10 PROCEDURE — 93010 ELECTROCARDIOGRAM REPORT: CPT | Performed by: INTERNAL MEDICINE

## 2021-04-10 PROCEDURE — 2580000003 HC RX 258: Performed by: INTERNAL MEDICINE

## 2021-04-10 PROCEDURE — 93306 TTE W/DOPPLER COMPLETE: CPT

## 2021-04-10 PROCEDURE — 85025 COMPLETE CBC W/AUTO DIFF WBC: CPT

## 2021-04-10 RX ORDER — LABETALOL 100 MG/1
50 TABLET, FILM COATED ORAL EVERY 12 HOURS SCHEDULED
Qty: 30 TABLET | Refills: 3 | Status: SHIPPED | OUTPATIENT
Start: 2021-04-10

## 2021-04-10 RX ADMIN — SODIUM CHLORIDE, POTASSIUM CHLORIDE, SODIUM LACTATE AND CALCIUM CHLORIDE: 600; 310; 30; 20 INJECTION, SOLUTION INTRAVENOUS at 08:00

## 2021-04-10 RX ADMIN — LABETALOL HYDROCHLORIDE 50 MG: 100 TABLET, FILM COATED ORAL at 10:21

## 2021-04-10 NOTE — PROGRESS NOTES
Dr. Clark Farias at bedside discussing [address] with pt. evening medications given at this time.

## 2021-04-10 NOTE — PROGRESS NOTES
Discharge instructions given. Allowed time for questions/answers. Verbalizes understanding of all instructions given. Rx for Labetalol given. Ambulated out, undelivered, not in active labor.

## 2021-04-10 NOTE — PLAN OF CARE
Problem: Falls - Risk of:  Goal: Will remain free from falls  Description: Will remain free from falls  4/10/2021 0014 by David Da Silva RN  Outcome: Ongoing  4/9/2021 1732 by Heriberto Maguire RN  Outcome: Ongoing  Goal: Absence of physical injury  Description: Absence of physical injury  4/10/2021 0014 by David Da Silva RN  Outcome: Ongoing  4/9/2021 1732 by Heriberto Maguire RN  Outcome: Ongoing     Problem: Healthcare acquired conditions:  Goal: Absence of healthcare acquired conditions  Description: Absence of healthcare acquired conditions  4/10/2021 0014 by David Da Silva RN  Outcome: Ongoing  4/9/2021 1732 by Heriberto Maguire RN  Outcome: Ongoing     Problem: Pain:  Goal: Pain level will decrease  Description: Pain level will decrease  Outcome: Ongoing  Goal: Control of acute pain  Description: Control of acute pain  Outcome: Ongoing  Goal: Control of chronic pain  Description: Control of chronic pain  Outcome: Ongoing

## 2021-04-10 NOTE — DISCHARGE SUMMARY
Inpatient Discharge Summary    Admission Diagnosis:    1. IUP at 31+3 weeks   2. Palpitations   3. Hypothyroidism   4. History of gHTN   5. H/o depression   6. H/o CD  Discharge Diagnosis:    1. IUP at 31+4 weeks   2. Palpitations   3. Hypothyroidism   4. History of gHTN   5. H/o depression   6. H/o CD  Procedures: echo  Consults: cardiology  Complications: none    Hospital course: Filiberto Chavira presented with tachycardia to 190s, returned to 120s with vagal maneuvers. Cardiology consulted and patient started on labetalol and echo completed that showed redundancy of interatrial septum and patient was started on daily aspirin with possible bubble study to be completed postpartum per cardiology. She was discharged to home in stable condition.     Discharge Instructions:  Diet:common adult  Activity:activity as tolerated,   Medications: see MAR    Disposition: Home  Condition on discharge: Stable  Follow up: in 1 week(s)    Migue Whipple MD  [facility]

## 2021-04-10 NOTE — PROGRESS NOTES
Dr Ania Quijano here to see pt. Verbal order received to leave monitors off. Will notify Dr Ania Quijano when echo report received.

## 2021-04-10 NOTE — PROGRESS NOTES
Antepartum Progress Note    Subjective:  36 y.o. Darrell Rhodes at 31w4d (Estimated Date of Delivery: 6/8/21) here for tachycardia. Doing well today, denies any complaints. No chest pain, SOB or palpiations since admission. No pregnancy concerns, denies VB, LOF, ctx. +FM. Review of Systems - The following ROS was otherwise negative, except as noted in the HPI: constitutional, respiratory, cardiovascular, gastrointestinal, genitourinary    Objective:  /71   Pulse 86   Temp 97.9 °F (36.6 °C) (Oral)   Resp 16   Ht 5' (1.524 m)   Wt 200 lb (90.7 kg)   LMP 09/01/2020   SpO2 97%   BMI 39.06 kg/m²   Physical Exam  Constitutional:       Appearance: Normal appearance. Cardiovascular:      Rate and Rhythm: Normal rate and regular rhythm. Pulmonary:      Effort: Pulmonary effort is normal. No respiratory distress. Abdominal:      Palpations: Abdomen is soft. Tenderness: There is no abdominal tenderness. There is no guarding or rebound. Skin:     General: Skin is warm and dry. Neurological:      General: No focal deficit present. Mental Status: She is alert.    Psychiatric:         Mood and Affect: Mood normal.         Fetal Monitoring (from this AM)  FHTs: 140 baseline, moderate variability, + accelerations, no decelerations  [place]: quiet    Labs  Admission on 04/09/2021   Component Date Value Ref Range Status    Ventricular Rate 04/09/2021 127  BPM Preliminary    Atrial Rate 04/09/2021 127  BPM Preliminary    P-R Interval 04/09/2021 120  ms Preliminary    QRS Duration 04/09/2021 82  ms Preliminary    Q-T Interval 04/09/2021 310  ms Preliminary    QTc Calculation (Bazett) 04/09/2021 450  ms Preliminary    P Axis 04/09/2021 54  degrees Preliminary    R Axis 04/09/2021 58  degrees Preliminary    T Axis 04/09/2021 11  degrees Preliminary    Diagnosis 04/09/2021 Sinus tachycardiaOtherwise normal ECGWhen compared with ECG of 09-APR-2021 16:21,No significant change was found   Preliminary    WBC 04/09/2021 8.9  4.0 - 11.0 K/uL Final    RBC 04/09/2021 4.06  4.00 - 5.20 M/uL Final    Hemoglobin 04/09/2021 12.0  12.0 - 16.0 g/dL Final    Hematocrit 04/09/2021 36.0  36.0 - 48.0 % Final    MCV 04/09/2021 88.7  80.0 - 100.0 fL Final    MCH 04/09/2021 29.5  26.0 - 34.0 pg Final    MCHC 04/09/2021 33.2  31.0 - 36.0 g/dL Final    RDW 04/09/2021 15.0  12.4 - 15.4 % Final    Platelets 85/39/5423 200  135 - 450 K/uL Final    MPV 04/09/2021 10.6* 5.0 - 10.5 fL Final    Neutrophils % 04/09/2021 73.4  % Final    Lymphocytes % 04/09/2021 12.9  % Final    Monocytes % 04/09/2021 11.9  % Final    Eosinophils % 04/09/2021 1.5  % Final    Basophils % 04/09/2021 0.3  % Final    Neutrophils Absolute 04/09/2021 6.5  1.7 - 7.7 K/uL Final    Lymphocytes Absolute 04/09/2021 1.2  1.0 - 5.1 K/uL Final    Monocytes Absolute 04/09/2021 1.1  0.0 - 1.3 K/uL Final    Eosinophils Absolute 04/09/2021 0.1  0.0 - 0.6 K/uL Final    Basophils Absolute 04/09/2021 0.0  0.0 - 0.2 K/uL Final    Sodium 04/09/2021 131* 136 - 145 mmol/L Final    Potassium 04/09/2021 3.9  3.5 - 5.1 mmol/L Final    Chloride 04/09/2021 100  99 - 110 mmol/L Final    CO2 04/09/2021 21  21 - 32 mmol/L Final    Anion Gap 04/09/2021 10  3 - 16 Final    Glucose 04/09/2021 142* 70 - 99 mg/dL Final    BUN 04/09/2021 6* 7 - 20 mg/dL Final    CREATININE 04/09/2021 <0.5* 0.6 - 1.1 mg/dL Final    GFR Non- 04/09/2021 >60  >60 Final    Comment: >60 mL/min/1.73m2 EGFR, calc. for ages 25 and older using the  MDRD formula (not corrected for weight), is valid for stable  renal function.  GFR  04/09/2021 >60  >60 Final    Comment: Chronic Kidney Disease: less than 60 ml/min/1.73 sq.m. Kidney Failure: less than 15 ml/min/1.73 sq.m. Results valid for patients 18 years and older.       Calcium 04/09/2021 9.1  8.3 - 10.6 mg/dL Final    Total Protein 04/09/2021 6.1* 6.4 - 8.2 g/dL Final    Albumin 04/09/2021 3.3* 3.4 36.0 - 48.0 % Final    MCV 04/10/2021 89.9  80.0 - 100.0 fL Final    MCH 04/10/2021 30.2  26.0 - 34.0 pg Final    MCHC 04/10/2021 33.6  31.0 - 36.0 g/dL Final    RDW 04/10/2021 15.1  12.4 - 15.4 % Final    Platelets 59/44/5691 149  135 - 450 K/uL Final    MPV 04/10/2021 10.2  5.0 - 10.5 fL Final    Neutrophils % 04/10/2021 70.3  % Final    Lymphocytes % 04/10/2021 16.0  % Final    Monocytes % 04/10/2021 10.9  % Final    Eosinophils % 04/10/2021 2.4  % Final    Basophils % 04/10/2021 0.4  % Final    Neutrophils Absolute 04/10/2021 4.9  1.7 - 7.7 K/uL Final    Lymphocytes Absolute 04/10/2021 1.1  1.0 - 5.1 K/uL Final    Monocytes Absolute 04/10/2021 0.8  0.0 - 1.3 K/uL Final    Eosinophils Absolute 04/10/2021 0.2  0.0 - 0.6 K/uL Final    Basophils Absolute 04/10/2021 0.0  0.0 - 0.2 K/uL Final    Ventricular Rate 04/10/2021 89  BPM Preliminary    Atrial Rate 04/10/2021 89  BPM Preliminary    P-R Interval 04/10/2021 136  ms Preliminary    QRS Duration 04/10/2021 96  ms Preliminary    Q-T Interval 04/10/2021 372  ms Preliminary    QTc Calculation (Bazett) 04/10/2021 452  ms Preliminary    P Axis 04/10/2021 64  degrees Preliminary    R Axis 04/10/2021 83  degrees Preliminary    T Axis 04/10/2021 34  degrees Preliminary    Diagnosis 04/10/2021 Normal sinus rhythmNormal ECGWhen compared with ECG of 09-APR-2021 16:22,No significant change was found   Preliminary       Assessment/Plan  36 y.o. Darrell Rhodes at 31w4d here for palpitations/tachycardia    1) FWB-reassuring  - reactive earlier this AM, will get NST now and then monitor qshift    2. Tachycardia  - cardiology consulted, appreciate recs  - started on labetalol, normal rate this AM  - echo this AM  - labs stable, TSH/T4 pending     3. H/o gHTN  - normotensive today    4.  Hypothryoid  - TSH/T4 pending    Disposition:  Pending echo results and cardiology recommendations this Liz Baird MD

## 2021-04-10 NOTE — PROGRESS NOTES
Resp: 16 16 16 16   Temp: 98.3 °F (36.8 °C)   97.9 °F (36.6 °C)   TempSrc: Oral   Oral   SpO2:       Weight:       Height:             Intake/Output Summary (Last 24 hours) at 4/10/2021 1104  Last data filed at 4/10/2021 0830  Gross per 24 hour   Intake 2000 ml   Output 1250 ml   Net 750 ml     Wt Readings from Last 3 Encounters:   04/09/21 200 lb (90.7 kg)   04/08/21 201 lb 12.8 oz (91.5 kg)   03/24/21 204 lb 2 oz (92.6 kg)         Gen: Patient in NAD, resting comfortably, able to speak in full sentences. Neck: No JVD or bruits  Respiratory: CTAB no WRR  Chest: normal without deformity  Cardiovascular:RRR, S1S2, no mrg, normal PMI  Abdomen: Soft, protuberant  Extremities: +1 bilateral lower extremity edema  Neurological/Psychiatric: AxO x4, No gross motors/sensory deficits  Skin:  Warm and dry      Labs:  CBC:   Recent Labs     04/09/21  1550 04/10/21  0618   WBC 8.9 7.0   HGB 12.0 10.6*   HCT 36.0 31.6*   MCV 88.7 89.9    149     BMP:   Recent Labs     04/09/21  1550 04/10/21  0618   * 134*   K 3.9 3.8    102   CO2 21 25   BUN 6* 8   CREATININE <0.5* <0.5*     MG:  No results for input(s): MG in the last 72 hours. PT/INR: No results for input(s): PROTIME, INR in the last 72 hours. APTT: No results for input(s): APTT in the last 72 hours. Cardiac Enzymes: No results for input(s): CKTOTAL, CKMB, CKMBINDEX, TROPONINI in the last 72 hours. Cardiac Studies:    Echo    Conclusions      Summary   Normal left ventricle systolic function with an estimated ejection fraction   of 55%. No regional wall motion abnormalities are seen. Normal left ventricular diastolic filling pressure. Mild mitral regurgitation. Redundancy of the interatrial septum, consider bubble study. I have reviewed labs and imaging/xray/diagnostic testing in this note.     Assessment and Plan       Patient Active Problem List   Diagnosis    Hypothyroidism    Prenatal care, subsequent pregnancy in third trimester    History of gestational hypertension    Depression affecting pregnancy    Mild intermittent asthma without complication    History of  delivery    Low-lying placenta    Abnormal EKG    Tachycardia    Palpitations     Tachycardia, likely sinus tachycardia with possible bursts of SVT, continue treatment beta-blocker, echocardiogram is reassuring, okay for discharge from cardiac perspective, will plan outpatient follow-up including planning for follow-up cardiac event monitor. Redundancy of interatrial septum, this is likely a benign congenital finding, can consider follow-up echocardiogram and bubble study following pregnancy. It would be reasonable to consider daily aspirin, will order this as long as okay with OB service    We will sign off, please call with questions. Thank you for allowing me to participate in the care of your patient. Please call me with any questions [phone].       Amanda Mora MD, [address] [facility]   Interventional Cardiologist  [address]  [phone] [facility]  [phone] [address] [place] [address]  4/10/2021 11:04 AM

## 2021-04-15 ENCOUNTER — TELEPHONE (OUTPATIENT)
Dept: OBGYN CLINIC | Age: 31
End: 2021-04-15

## 2021-04-15 ENCOUNTER — OFFICE VISIT (OUTPATIENT)
Dept: CARDIOLOGY CLINIC | Age: 31
End: 2021-04-15
Payer: OTHER GOVERNMENT

## 2021-04-15 VITALS
HEART RATE: 97 BPM | HEIGHT: 60 IN | BODY MASS INDEX: 39.27 KG/M2 | OXYGEN SATURATION: 98 % | WEIGHT: 200 LBS | SYSTOLIC BLOOD PRESSURE: 122 MMHG | DIASTOLIC BLOOD PRESSURE: 78 MMHG

## 2021-04-15 DIAGNOSIS — R00.2 PALPITATIONS: ICD-10-CM

## 2021-04-15 DIAGNOSIS — R94.31 ABNORMAL EKG: ICD-10-CM

## 2021-04-15 DIAGNOSIS — R00.0 TACHYCARDIA: Primary | ICD-10-CM

## 2021-04-15 DIAGNOSIS — R53.83 FATIGUE, UNSPECIFIED TYPE: ICD-10-CM

## 2021-04-15 PROCEDURE — 99214 OFFICE O/P EST MOD 30 MIN: CPT | Performed by: INTERNAL MEDICINE

## 2021-04-15 NOTE — PROGRESS NOTES
[facility]   CARDIAC EVALUATION NOTE  [phone]      PCP:  No primary care provider on file. Reason for Consultation/Chief Complaint: HSFU for palpitations     Subjective   History of Present Illness:  Soha Bee is a 36 y.o. patient who presents for hospital follow up. She presented to the hospital on 4/10/2021 with complaints of a fast heart rate. She was 31 weeks pregnant, was admitted to the [facility] service. Heart rate was noted up to 190 bpm. With vagal maneuvers, heart rate declined and evaluation showed sinus tachycardia. She was treated with labetalol. An echocardiogram from 4/10/2021 showed an EF of 55%, mild mitral regurgitation, redundancy of interatrial septum, likely a benign congenital finding, consider follow-up echocardiogram and bubble study following pregnancy. Today she states she has been feeling well other than fatigue. She states she has not had any palpitations but has an occasional chest tightness. She is taking Labetalol and seems to be tolerating this well. She will be seeing her OBGYN tomorrow. Patient currently denies any weight gain, edema, palpitations, shortness of breath, dizziness, and syncope. She is scheduled for a  at the beginning of . Past Medical History:   has a past medical history of ACL injury tear, Asthma, Depression, History of blood transfusion, Hypothyroid, Postpartum depression, and PTSD (post-traumatic stress disorder). Surgical History:   has a past surgical history that includes  section; Umbilical hernia repair; and Bone Marrow [place] (). Social History:   reports that she has never smoked. She has never used smokeless tobacco. She reports previous alcohol use of about 1.0 standard drinks of alcohol per week. She reports that she does not use drugs. Family History:  family history is not on file.       Home Medications:  Were reviewed and are listed in nursing record and/or below  Prior to Admission medications    Medication Sig Start Date End Date Taking? Authorizing Provider   labetalol (NORMODYNE) 100 MG tablet Take 0.5 tablets by mouth every 12 hours 4/10/21  Yes Edgar Maldonado MD   [facility] Devices (BREAST PUMP) [facility] [place] [place] 1 Device by Does not apply route as needed (as needed) Breast pump 21  Yes Edgar Maldonado MD   levothyroxine (SYNTHROID) 50 MCG tablet Take 1 tablet by mouth Daily 21  Yes Edgar Maldonado MD   aspirin (ASPIRIN 81) 81 MG EC tablet Take 1 tablet by mouth daily 20  Yes Edgar Maldonado MD   Prenatal Multivit-Min-Fe-FA (PRE- PO) Take by mouth   Yes Historical Provider, MD   budesonide-formoterol (SYMBICORT) 160-4.5 MCG/ACT AERO Inhale 2 puffs into the lungs 2 times daily   Yes Historical Provider, MD   DULoxetine (CYMBALTA) 30 MG extended release capsule Take 30 mg by mouth daily   Yes Historical Provider, MD   albuterol (VOSPIRE ER) 4 MG extended release tablet Take 4 mg by mouth every 12 hours PRN   Yes Historical Provider, MD   ondansetron (ZOFRAN-ODT) 4 MG disintegrating tablet Take 1 tablet by mouth every 8 hours as needed for Nausea  Patient not taking: Reported on 4/15/2021 2/11/21   Edgar Maldonado MD          Allergies:  Bactrim [sulfamethoxazole-trimethoprim]     Review of Systems:   A 14 point review of symptoms completed. Pertinent positives identified in the HPI, all other review of symptoms negative as below.       Objective   PHYSICAL EXAM:    Vitals:    04/15/21 1508   BP: 122/78   Pulse: 97   SpO2: 98%    Weight: 200 lb (90.7 kg)         General Appearance:  Alert, cooperative, no distress, appears stated age, sitting in chair, speaks in full sentences    Head:  Normocephalic, without obvious abnormality, atraumatic   Eyes:  PERRL, conjunctiva/corneas clear   Nose: Nares normal, no drainage or sinus tenderness   Throat: Not able to examine due to mask   Neck: Supple, no jvp    Lungs:   Clear to auscultation bilaterally, respirations unlabored   Chest Wall:  No deformity or tenderness   Heart:  Regular rate and rhythm, S1, S2 normal, no murmur, rub or gallop   Abdomen:   Soft, protuberant   Extremities: Extremities normal, atraumatic, no cyanosis or edema   Pulses: 2+ and symmetric   Skin: Skin color, texture, turgor normal, no rashes or lesions   Pysch: Normal mood and affect   Neurologic: Normal gross motor and sensory exam.         Labs   CBC:   Lab Results   Component Value Date    WBC 7.0 04/10/2021    RBC 3.51 04/10/2021    HGB 10.6 04/10/2021    HCT 31.6 04/10/2021    MCV 89.9 04/10/2021    RDW 15.1 04/10/2021     04/10/2021     CMP:  Lab Results   Component Value Date     04/10/2021    K 3.8 04/10/2021     04/10/2021    CO2 25 04/10/2021    BUN 8 04/10/2021    CREATININE <0.5 04/10/2021    GFRAA >60 04/10/2021    AGRATIO 1.2 04/10/2021    LABGLOM >60 04/10/2021    GLUCOSE 89 04/10/2021    PROT 5.4 04/10/2021    CALCIUM 8.4 04/10/2021    BILITOT <0.2 04/10/2021    ALKPHOS 93 04/10/2021    AST 19 04/10/2021    ALT 13 04/10/2021     PT/INR:  No results found for: PTINR  HgBA1c:No results found for: LABA1C  No results found for: CKTOTAL, CKMB, CKMBINDEX, TROPONINI      Cardiac Data     Last EK/10/2021  Normal sinus rhythm    EKG 2021  Sinus tachycardia, rate 127 BPM     Echo: 4/10/2021  Summary   Normal left ventricle systolic function with an estimated ejection fraction   of 55%. No regional wall motion abnormalities are seen. Normal left ventricular diastolic filling pressure. Mild mitral regurgitation. Redundancy of the interatrial septum, consider bubble study. Stress Test:    Cath:    Studies:       I have reviewed labs and imaging/xray/diagnostic testing in this note. Assessment      1. Tachycardia    2. Abnormal EKG    3. Palpitations    4. Fatigue, unspecified type      4. Redundancy of interatrial septum,             Plan   1. Will plan to repeat an echocardiogram after pregnancy with bubble study   2.  Continue Aspirin 81 mg daily and labetolol   3. Follow up with NP in 2-3 months   4. Will hold off monitor for now but call with any increase in symptoms       Scribe's attestation: This note was scribed in the presence of Dr. Chato Siu M.D. By Alicia Fisher RN      Thank you for allowing us to participate in the care of Maldonado Bernstein. Please call me with any questions [phone]. Chato Siu MD, [facility]   Interventional Cardiologist  [place] [address]  [phone] [facility]  [phone] [facility]  4/15/2021 3:45 PM    I will address the patient's cardiac risk factors and adjusted pharmacologic treatment as needed. In addition, I have reinforced the need for patient directed risk factor modification. Tobacco use was discussed with the patient and educated on the negative effects and was asked not to use. All questions and concerns were addressed to the patient/family. Alternatives to my treatment were discussed. I, Dr Chato Siu, personally performed the services described in this documentation, as scribed by the above signed scribe in my presence. It is both accurate and complete to my knowledge. I agree with the details independently gathered by the clinical support staff and the scribed note accurately describes my personal service to the patient.

## 2021-04-16 ENCOUNTER — ROUTINE PRENATAL (OUTPATIENT)
Dept: OBGYN CLINIC | Age: 31
End: 2021-04-16

## 2021-04-16 VITALS
DIASTOLIC BLOOD PRESSURE: 72 MMHG | WEIGHT: 206.6 LBS | BODY MASS INDEX: 40.35 KG/M2 | SYSTOLIC BLOOD PRESSURE: 118 MMHG | HEART RATE: 103 BPM

## 2021-04-16 DIAGNOSIS — O99.340 DEPRESSION AFFECTING PREGNANCY: ICD-10-CM

## 2021-04-16 DIAGNOSIS — Z87.59 HISTORY OF GESTATIONAL HYPERTENSION: ICD-10-CM

## 2021-04-16 DIAGNOSIS — F32.A DEPRESSION AFFECTING PREGNANCY: ICD-10-CM

## 2021-04-16 DIAGNOSIS — R00.2 PALPITATIONS: ICD-10-CM

## 2021-04-16 DIAGNOSIS — Z98.891 HISTORY OF CESAREAN DELIVERY: ICD-10-CM

## 2021-04-16 DIAGNOSIS — Z34.83 PRENATAL CARE, SUBSEQUENT PREGNANCY IN THIRD TRIMESTER: Primary | ICD-10-CM

## 2021-04-16 DIAGNOSIS — E03.8 OTHER SPECIFIED HYPOTHYROIDISM: ICD-10-CM

## 2021-04-16 DIAGNOSIS — O44.40 LOW-LYING PLACENTA: ICD-10-CM

## 2021-04-16 PROCEDURE — 0502F SUBSEQUENT PRENATAL CARE: CPT | Performed by: OBSTETRICS & GYNECOLOGY

## 2021-04-16 NOTE — PROGRESS NOTES
Temp 98.2      Maternal emotional well being screening form completed and reviewed with patient. Current score is 6.

## 2021-04-16 NOTE — PROGRESS NOTES
Return OB Office Visit    CC:   Chief Complaint   Patient presents with    Routine Prenatal Visit    Follow-up       HPI:  Pt seen and examined. No concerns/complaints. Denies VB, LOF, ctx. +FM. Just feeling tired, otherwise no complaints. Was admitted to hospital over the weekend for tachycardia, seen by cardiology who started her on labetalol and ordered echo (possible PFO). Has had outpatinet follow-up with cards since then. Maternal wellness questionnaire reviewed - no concerns today. Score 6.      Objective:  /72   Pulse 103   Wt 206 lb 9.6 oz (93.7 kg)   LMP 2020   BMI 40.35 kg/m²   Gen: AO, NAD  Abd: Soft, NT  FHT: 147  FH: 33cm, vertex by [place]  Ext: Mild LE edema    Assessment/Plan:  36 y.o.  at [address] [facility] (Estimated Date of Delivery: 21) presents for PHILLIP appointment:     Problem List Items Addressed This Visit        Gynecology/Obstetric Problems    Prenatal care, subsequent pregnancy in third trimester - Primary     - FWB: reassuring by micky today  - Genetic screening: MQS low risk  - Anatomy scan: done 21, suboptimal due to fetal positioning, low lying anterior placenta, normal CL and normal fluid, male fetus    - completion 2/10/21, wnl aside from suboptimal LVOT and sacral spine    - scan 3/11/21 completed  - Tdap: 3/10/21  - PNL: A+/ab-, RI, HepB neg, RPRnr, HIV neg, UDS negative, urine culture negative, GCCT/trich next, Hgb 13.2    - 1hr , Hgb 11.0, plt 173         Low-lying placenta     Resolved, now >2.7cm from os  - normal appearing placenta/uterine interface             Other    Hypothyroidism     Currently on synthroid 50mcg daily  - TSH  2.17  - TSH 3/11 2.45, T4 0.9  - TSH  2.48, T4 1.0         History of gestational hypertension     Reports prior gHTN in labor  - baseline CBC/CMP wnl  - 24hr urine wnl  - continue daily ASA         Depression affecting pregnancy     On Cymbalta  - continue to monitor  - [facility], looking for possible referral from us. Will look into coverage for appt with Corwin Nuñez         History of  delivery     RCD scheduled 21 at 0800         Palpitations     See by cardiology, see notes in epic  - continue daily labetalol and daily baby aspirin  - deferred monitor for now               Considering prophylactic salpingectomy at time of  --> Paternal aunt x2 with ovarian cancer.  Will consult ethics and sign papers at next visit    Dispo: RTC in 2 weeks  Juan C Cast MD

## 2021-04-19 NOTE — ASSESSMENT & PLAN NOTE
Currently on synthroid 50mcg daily  - TSH 12/18 2.17  - TSH 3/11 2.45, T4 0.9  - TSH 4/11 2.48, T4 1.0

## 2021-04-19 NOTE — ASSESSMENT & PLAN NOTE
See by cardiology, see notes in epic  - continue daily labetalol and daily baby aspirin  - deferred monitor for now

## 2021-04-22 ENCOUNTER — TELEPHONE (OUTPATIENT)
Dept: OBGYN CLINIC | Age: 31
End: 2021-04-22

## 2021-04-22 NOTE — TELEPHONE ENCOUNTER
Patient called in and stated she got confirmation that she can have an elective sterilization during the delivery/c section. Stated it is covered. Making you aware.      Routing to Dr. Bassem Patton

## 2021-04-22 NOTE — TELEPHONE ENCOUNTER
Thanks, I will touch base with [facility] to get approval for opportunistic salpingectomy at the time of her  given family history of ovarian cancer.

## 2021-04-30 ENCOUNTER — ROUTINE PRENATAL (OUTPATIENT)
Dept: OBGYN CLINIC | Age: 31
End: 2021-04-30

## 2021-04-30 VITALS
WEIGHT: 204.4 LBS | SYSTOLIC BLOOD PRESSURE: 124 MMHG | DIASTOLIC BLOOD PRESSURE: 68 MMHG | HEART RATE: 113 BPM | BODY MASS INDEX: 39.92 KG/M2

## 2021-04-30 DIAGNOSIS — Z34.83 PRENATAL CARE, SUBSEQUENT PREGNANCY IN THIRD TRIMESTER: Primary | ICD-10-CM

## 2021-04-30 DIAGNOSIS — O99.340 DEPRESSION AFFECTING PREGNANCY: ICD-10-CM

## 2021-04-30 DIAGNOSIS — Z98.891 HISTORY OF CESAREAN DELIVERY: ICD-10-CM

## 2021-04-30 DIAGNOSIS — F32.A DEPRESSION AFFECTING PREGNANCY: ICD-10-CM

## 2021-04-30 DIAGNOSIS — E03.8 OTHER SPECIFIED HYPOTHYROIDISM: ICD-10-CM

## 2021-04-30 DIAGNOSIS — Z80.41 FAMILY HISTORY OF OVARIAN CANCER: ICD-10-CM

## 2021-04-30 DIAGNOSIS — O44.40 LOW-LYING PLACENTA: ICD-10-CM

## 2021-04-30 DIAGNOSIS — Z87.59 HISTORY OF GESTATIONAL HYPERTENSION: ICD-10-CM

## 2021-04-30 DIAGNOSIS — R00.2 PALPITATIONS: ICD-10-CM

## 2021-04-30 PROCEDURE — 0502F SUBSEQUENT PRENATAL CARE: CPT | Performed by: OBSTETRICS & GYNECOLOGY

## 2021-04-30 NOTE — ASSESSMENT & PLAN NOTE
- In multiple paternal aunts  - desires RR salpingectomy at time of CD  - will discuss with AIDA Rogers

## 2021-04-30 NOTE — PROGRESS NOTES
6/1/21 at 0800         Palpitations     See by cardiology, see notes in epic  - continue daily labetalol and daily baby aspirin  - deferred monitor for now         Family history of ovarian cancer     - In multiple paternal aunts  - desires RR salpingectomy at time of CD  - will discuss with AIDA Rogers               Dispo: RTC in 2 weeks  Ann Marie Pickett MD

## 2021-04-30 NOTE — ASSESSMENT & PLAN NOTE
- FWB: reassuring by doptones today  - Genetic screening: S low risk  - Anatomy scan: done 1/13/21, suboptimal due to fetal positioning, low lying anterior placenta, normal CL and normal fluid, male fetus    - completion 2/10/21, wnl aside from suboptimal LVOT and sacral spine    - scan 3/11/21 completed  - Tdap: 3/10/21  - PNL: A+/ab-, RI, HepB neg, RPRnr, HIV neg, UDS negative, urine culture negative, GCCT/trich next, Hgb 13.2    - 1hr , Hgb 11.0, plt 173    - GBS next visit

## 2021-04-30 NOTE — ASSESSMENT & PLAN NOTE
On Cymbalta  - continue to monitor   - [facility], looking for possible referral from us.  Will look into coverage for appt with Elle Rothman

## 2021-05-04 ENCOUNTER — HOSPITAL ENCOUNTER (OUTPATIENT)
Age: 31
Discharge: HOME | End: 2021-05-04
Attending: OBSTETRICS & GYNECOLOGY | Admitting: OBSTETRICS & GYNECOLOGY
Payer: OTHER GOVERNMENT

## 2021-05-04 ENCOUNTER — TELEPHONE (OUTPATIENT)
Dept: OBGYN CLINIC | Age: 31
End: 2021-05-04

## 2021-05-04 VITALS
HEIGHT: 60 IN | BODY MASS INDEX: 39.27 KG/M2 | SYSTOLIC BLOOD PRESSURE: 128 MMHG | DIASTOLIC BLOOD PRESSURE: 68 MMHG | TEMPERATURE: 98.1 F | HEART RATE: 103 BPM | WEIGHT: 200 LBS | RESPIRATION RATE: 18 BRPM

## 2021-05-04 LAB
BACTERIA: ABNORMAL /HPF
BASOPHILS ABSOLUTE: 0 K/UL (ref 0–0.2)
BASOPHILS RELATIVE PERCENT: 0.4 %
BILIRUBIN URINE: NEGATIVE
BLOOD, URINE: NEGATIVE
CLARITY: CLEAR
COLOR: YELLOW
EOSINOPHILS ABSOLUTE: 0.1 K/UL (ref 0–0.6)
EOSINOPHILS RELATIVE PERCENT: 1.5 %
EPITHELIAL CELLS, UA: ABNORMAL /HPF (ref 0–5)
GLUCOSE URINE: NEGATIVE MG/DL
HCT VFR BLD CALC: 35.1 % (ref 36–48)
HEMOGLOBIN: 11.8 G/DL (ref 12–16)
KETONES, URINE: 15 MG/DL
LEUKOCYTE ESTERASE, URINE: NEGATIVE
LYMPHOCYTES ABSOLUTE: 1.1 K/UL (ref 1–5.1)
LYMPHOCYTES RELATIVE PERCENT: 13.5 %
MCH RBC QN AUTO: 29.5 PG (ref 26–34)
MCHC RBC AUTO-ENTMCNC: 33.7 G/DL (ref 31–36)
MCV RBC AUTO: 87.5 FL (ref 80–100)
MICROSCOPIC EXAMINATION: YES
MONOCYTES ABSOLUTE: 1 K/UL (ref 0–1.3)
MONOCYTES RELATIVE PERCENT: 11.9 %
NEUTROPHILS ABSOLUTE: 5.9 K/UL (ref 1.7–7.7)
NEUTROPHILS RELATIVE PERCENT: 72.7 %
NITRITE, URINE: NEGATIVE
PDW BLD-RTO: 15 % (ref 12.4–15.4)
PH UA: 6.5 (ref 5–8)
PLATELET # BLD: 197 K/UL (ref 135–450)
PMV BLD AUTO: 9.8 FL (ref 5–10.5)
PROTEIN UA: ABNORMAL MG/DL
RBC # BLD: 4 M/UL (ref 4–5.2)
RBC UA: ABNORMAL /HPF (ref 0–4)
SPECIFIC GRAVITY UA: 1.02 (ref 1–1.03)
URINE TYPE: ABNORMAL
UROBILINOGEN, URINE: 0.2 E.U./DL
WBC # BLD: 8.1 K/UL (ref 4–11)
WBC UA: ABNORMAL /HPF (ref 0–5)

## 2021-05-04 PROCEDURE — 99211 OFF/OP EST MAY X REQ PHY/QHP: CPT

## 2021-05-04 PROCEDURE — 99234 HOSP IP/OBS SM DT SF/LOW 45: CPT | Performed by: OBSTETRICS & GYNECOLOGY

## 2021-05-04 PROCEDURE — 81001 URINALYSIS AUTO W/SCOPE: CPT

## 2021-05-04 PROCEDURE — 96360 HYDRATION IV INFUSION INIT: CPT

## 2021-05-04 PROCEDURE — 87086 URINE CULTURE/COLONY COUNT: CPT

## 2021-05-04 PROCEDURE — 85025 COMPLETE CBC W/AUTO DIFF WBC: CPT

## 2021-05-04 PROCEDURE — 2580000003 HC RX 258: Performed by: OBSTETRICS & GYNECOLOGY

## 2021-05-04 RX ORDER — SODIUM CHLORIDE, SODIUM LACTATE, POTASSIUM CHLORIDE, AND CALCIUM CHLORIDE .6; .31; .03; .02 G/100ML; G/100ML; G/100ML; G/100ML
1000 INJECTION, SOLUTION INTRAVENOUS ONCE
Status: COMPLETED | OUTPATIENT
Start: 2021-05-04 | End: 2021-05-04

## 2021-05-04 RX ADMIN — SODIUM CHLORIDE, POTASSIUM CHLORIDE, SODIUM LACTATE AND CALCIUM CHLORIDE 1000 ML: 600; 310; 30; 20 INJECTION, SOLUTION INTRAVENOUS at 17:56

## 2021-05-04 ASSESSMENT — PAIN DESCRIPTION - DESCRIPTORS: DESCRIPTORS: CRAMPING;PRESSURE

## 2021-05-04 NOTE — PROGRESS NOTES
Pt arrived to triage with c/o contractions that started at 1200 accompanied with vaginal and hip pressure. Pt stated she has felt baby move but not as much as normal. Pt stated she has had an increase in vaginal fluid but has not had any gushes of fluid or vaginal bleeding.

## 2021-05-04 NOTE — H&P
Obstetrics Triage History and Physical    CC:   Chief Complaint   Patient presents with    Contractions     started around noon       HPI: Marilee Felix is a 36 y.o.  at 35w0d who presents with complaints of contractions/cramps. Earlier today were about 8 minutes, now feel like they are more like every 6 minutes. No VB, has had some increased leakage but no big gush of fluid. +FM but less than normal as well. Feels like the baby dropped so she's having a lot of pressure in her lower back and pelvis/pubic bone and feels like she has a bowling ball between her legs. Review of Systems: The following ROS was otherwise negative, except as noted in the HPI: constitutional, HEENT, respiratory, cardiovascular, gastrointestinal, genitourinary, skin, musculoskeletal, neurological, psych. OBGYN Provider : Joanne Brock    Obstetrical History:  OB History    Para Term  AB Living   2 1 1 0 0 1   SAB TAB Ectopic Molar Multiple Live Births   0 0 0 0 0 1      # Outcome Date GA Lbr Abdiaziz/2nd Weight Sex Delivery Anes PTL Lv   2 Current            1 Term 2016     CS-LTranv   SUN      Complications: Gestational hypertension, third trimester       Past Medical History:   Past Medical History:   Diagnosis Date    ACL injury tear     patient reports injury and ortho unable to find right ACL    Asthma     Depression     History of blood transfusion     Hypothyroid     Postpartum depression     PTSD (post-traumatic stress disorder)     taking duloxetine       Medications:  No current facility-administered medications on file prior to encounter.       Current Outpatient Medications on File Prior to Encounter   Medication Sig Dispense Refill    labetalol (NORMODYNE) 100 MG tablet Take 0.5 tablets by mouth every 12 hours 30 tablet 3    levothyroxine (SYNTHROID) 50 MCG tablet Take 1 tablet by mouth Daily 30 tablet 2    aspirin (ASPIRIN 81) 81 MG EC tablet Take 1 tablet by mouth daily 30 tablet 3    Prenatal Neurological:      General: No focal deficit present. Mental Status: She is alert. Psychiatric:         Mood and Affect: Mood normal.       Cervix: fingertip/50/-3 --> unchanged over 2.5 hours in triage  SSE: negative pool, fern, valsalva     Fetal Heart Monitor Interpretation:   FHT: 135 bpm, moderate variability, + accels, no decels  [place]ke: q5-6 min    Labs:  Admission on 2021   Component Date Value    WBC 2021 8.1     RBC 2021 4.00     Hemoglobin 2021 11.8*    Hematocrit 2021 35.1*    MCV 2021 87.5     MCH 2021 29.5     MCHC 2021 33.7     RDW 2021 15.0     Platelets  197     MPV 2021 9.8     Neutrophils % 2021 72.7     Lymphocytes % 2021 13.5     Monocytes % 2021 11.9     Eosinophils % 2021 1.5     Basophils % 2021 0.4     Neutrophils Absolute 2021 5.9     Lymphocytes Absolute 2021 1.1     Monocytes Absolute 2021 1.0     Eosinophils Absolute 2021 0.1     Basophils Absolute 2021 0.0     Color, UA 2021 Yellow     Clarity, UA 2021 Clear     Glucose, Ur 2021 Negative     Bilirubin Urine 2021 Negative     Ketones, Urine 2021 15*    Specific Gravity, UA 2021 1.020     Blood, Urine 2021 Negative     pH, UA 2021 6.5     Protein, UA 2021 TRACE*    Urobilinogen, Urine 2021 0.2     Nitrite, Urine 2021 Negative     Leukocyte Esterase, Urine 2021 Negative     Microscopic Examination 2021 YES     Urine Type 2021 NotGiven     WBC, UA 2021 0-2     RBC, UA 2021 0-2     Epithelial Cells, UA 2021 11-20*    Bacteria, UA 2021 2+*       Assessment/Plan:   36 y.o.  at 35w0d here for contractions     1.  FWB -reassuring  - cEFM    2. Rule-out labor   - No change over several hours in triage  - Given IVF bolus in triage, labs sent as above wnl  -

## 2021-05-04 NOTE — TELEPHONE ENCOUNTER
Patient called in and stated she is having consistent pain/tightness roughly 8 mins for about 35 to 46 seconds. , stated baby dropped significantly and she feels like she has a bowling ball in between her legs, stated she is not able to lay down and even put her legs together. Denies gush of fluid, stated she is noticing baby move less only about once or 2 times an hour. Routing to Dr. Hardeep Laura.

## 2021-05-04 NOTE — TELEPHONE ENCOUNTER
Patient called and made aware to present to delivery. Placed call to L&D [phone] and spoke with jag nurse, gave report and updated patient with ETA of about an hour.

## 2021-05-05 LAB — URINE CULTURE, ROUTINE: NORMAL

## 2021-05-05 NOTE — PROGRESS NOTES
Call to Dr. Mariola Elizondo. I updated her on patient's unchanged SVE. I also updated her on contraction pattern, FHR and pain level. Orders received to have lab add on a urine culture. Also to have her follow up at the office Thursday or Friday or sooner if needed.  Orders received to discharge patient

## 2021-05-05 NOTE — PROGRESS NOTES
Pt verbalized understanding of verbal and written discharge instructions and denies having questions at this time. Pt left OB unit at 2037 ambulatory, undelivered, and in stable condition, accompanied by . Patient is not in active labor.

## 2021-05-06 ENCOUNTER — ROUTINE PRENATAL (OUTPATIENT)
Dept: OBGYN CLINIC | Age: 31
End: 2021-05-06

## 2021-05-06 VITALS
BODY MASS INDEX: 40.35 KG/M2 | HEART RATE: 111 BPM | SYSTOLIC BLOOD PRESSURE: 130 MMHG | WEIGHT: 206.6 LBS | DIASTOLIC BLOOD PRESSURE: 78 MMHG

## 2021-05-06 DIAGNOSIS — E03.9 HYPOTHYROIDISM, UNSPECIFIED TYPE: ICD-10-CM

## 2021-05-06 DIAGNOSIS — Z34.83 PRENATAL CARE, SUBSEQUENT PREGNANCY IN THIRD TRIMESTER: Primary | ICD-10-CM

## 2021-05-06 DIAGNOSIS — O47.00 PRETERM UTERINE CONTRACTIONS: ICD-10-CM

## 2021-05-06 DIAGNOSIS — Z98.891 HISTORY OF CESAREAN DELIVERY: ICD-10-CM

## 2021-05-06 DIAGNOSIS — F32.A DEPRESSION AFFECTING PREGNANCY: ICD-10-CM

## 2021-05-06 DIAGNOSIS — O99.340 DEPRESSION AFFECTING PREGNANCY: ICD-10-CM

## 2021-05-06 DIAGNOSIS — Z80.41 FAMILY HISTORY OF OVARIAN CANCER: ICD-10-CM

## 2021-05-06 DIAGNOSIS — Z87.59 HISTORY OF GESTATIONAL HYPERTENSION: ICD-10-CM

## 2021-05-06 DIAGNOSIS — R00.2 PALPITATIONS: ICD-10-CM

## 2021-05-06 DIAGNOSIS — O44.40 LOW-LYING PLACENTA: ICD-10-CM

## 2021-05-06 PROCEDURE — 0502F SUBSEQUENT PRENATAL CARE: CPT | Performed by: OBSTETRICS & GYNECOLOGY

## 2021-05-06 NOTE — PROGRESS NOTES
Return OB Office Visit    CC:   Chief Complaint   Patient presents with    Routine Prenatal Visit       HPI:  Pt seen and examined. Since last visit was seen in triage for contractions, no dilation over 2.5 hours so was discharged to home. States she has continued to have contraction regularly. Had contractions all day yesterday, every 8-10 minutes. No VB, LOF, +FM. Maternal wellness questionnaire reviewed - no concerns today. Score 6.      Objective:  /78   Pulse 111   Wt 206 lb 9.6 oz (93.7 kg)   LMP 2020   BMI 40.35 kg/m²   Gen: AO, NAD  Abd: Soft, NT  FHT: 127  FH: 34cm, vertex by Leopolds  Ext: Mild LE edema  SVE: fingertip/50/-3    Assessment/Plan:  36 y.o.  at 35w2d (Estimated Date of Delivery: 21) presents for PHILLIP appointment:     Problem List Items Addressed This Visit        Gynecology/Obstetric Problems    Prenatal care, subsequent pregnancy in third trimester - Primary     - FWB: reassuring by micky today  - Genetic screening: S low risk  - Anatomy scan: done 21, suboptimal due to fetal positioning, low lying anterior placenta, normal CL and normal fluid, male fetus    - completion 2/10/21, wnl aside from suboptimal LVOT and sacral spine    - scan 3/11/21 completed  - Tdap: 3/10/21  - PNL: A+/ab-, RI, HepB neg, RPRnr, HIV neg, UDS negative, urine culture negative, GCCT/trich next, Hgb 13.2    - 1hr , Hgb 11.0, plt 173    - GBS sent today         Low-lying placenta     Resolved, now >2.7cm from os  - normal appearing placenta/uterine interface           uterine contractions     Cervix unchanged today, discussed symptomatic relief and offered procardia, pt declines at this time            Other    Hypothyroidism     Currently on synthroid 50mcg daily  - TSH  2.17  - TSH 3/11 2.45, T4 0.9  - TSH  2.48, T4 1.0         History of gestational hypertension     Reports prior gHTN in labor  - baseline CBC/CMP wnl  - 24hr urine wnl  - continue daily ASA         Depression affecting pregnancy     On Cymbalta  - continue to monitor  - [facility], looking for possible referral from us.  Will look into coverage for appt with Valentina Grande         History of  delivery     RCD scheduled 21 at 0800         Palpitations     See by cardiology, see notes in epic  - continue daily labetalol and daily baby aspirin  - deferred monitor for now         Family history of ovarian cancer     - In multiple paternal aunts  - desires RR salpingectomy at time of CD  - will discuss with [facility] Lo Walsh 917: RTC in 1 week  Stephane Leon MD

## 2021-05-06 NOTE — ASSESSMENT & PLAN NOTE
On Cymbalta  - continue to monitor  - sees [facility], looking for possible referral from us.  Will look into coverage for appt with Kayli Christopher

## 2021-05-06 NOTE — ASSESSMENT & PLAN NOTE
Cervix unchanged today, discussed symptomatic relief and offered procardia, pt declines at this time

## 2021-05-06 NOTE — PROGRESS NOTES
Temp-98. 3f infrared  Maternal emotional well being screening form completed and reviewed with patient. Current score is 6. Patient given referral to [facility] ([phone]):  No

## 2021-05-06 NOTE — ASSESSMENT & PLAN NOTE
- FWB: reassuring by doptones today  - Genetic screening: S low risk  - Anatomy scan: done 1/13/21, suboptimal due to fetal positioning, low lying anterior placenta, normal CL and normal fluid, male fetus    - completion 2/10/21, wnl aside from suboptimal LVOT and sacral spine    - scan 3/11/21 completed  - Tdap: 3/10/21  - PNL: A+/ab-, RI, HepB neg, RPRnr, HIV neg, UDS negative, urine culture negative, GCCT/trich next, Hgb 13.2    - 1hr , Hgb 11.0, plt 173    - GBS sent today

## 2021-05-09 LAB — GROUP B STREP CULTURE: NORMAL

## 2021-05-12 ENCOUNTER — ROUTINE PRENATAL (OUTPATIENT)
Dept: OBGYN CLINIC | Age: 31
End: 2021-05-12

## 2021-05-12 VITALS
DIASTOLIC BLOOD PRESSURE: 88 MMHG | SYSTOLIC BLOOD PRESSURE: 116 MMHG | BODY MASS INDEX: 40.08 KG/M2 | WEIGHT: 205.2 LBS | HEART RATE: 111 BPM

## 2021-05-12 DIAGNOSIS — Z98.891 HISTORY OF CESAREAN DELIVERY: ICD-10-CM

## 2021-05-12 DIAGNOSIS — R00.2 PALPITATIONS: ICD-10-CM

## 2021-05-12 DIAGNOSIS — Z87.59 HISTORY OF GESTATIONAL HYPERTENSION: ICD-10-CM

## 2021-05-12 DIAGNOSIS — O99.340 DEPRESSION AFFECTING PREGNANCY: ICD-10-CM

## 2021-05-12 DIAGNOSIS — Z80.41 FAMILY HISTORY OF OVARIAN CANCER: ICD-10-CM

## 2021-05-12 DIAGNOSIS — E03.8 OTHER SPECIFIED HYPOTHYROIDISM: ICD-10-CM

## 2021-05-12 DIAGNOSIS — F32.A DEPRESSION AFFECTING PREGNANCY: ICD-10-CM

## 2021-05-12 DIAGNOSIS — O44.40 LOW-LYING PLACENTA: ICD-10-CM

## 2021-05-12 DIAGNOSIS — Z34.83 PRENATAL CARE, SUBSEQUENT PREGNANCY IN THIRD TRIMESTER: Primary | ICD-10-CM

## 2021-05-12 PROCEDURE — 0502F SUBSEQUENT PRENATAL CARE: CPT | Performed by: OBSTETRICS & GYNECOLOGY

## 2021-05-12 NOTE — PROGRESS NOTES
Return OB Office Visit    CC:   Chief Complaint   Patient presents with    Routine Prenatal Visit       HPI:  Pt seen and examined. No concerns/complaints. Denies VB, LOF, +FM. Only have contractions in spurts, has 3-4 in a row a few times a day. No palpitations. Otherwise doing well. Maternal wellness questionnaire reviewed - no concerns today. Score 7.      Objective:  /88   Pulse 111   Wt 205 lb 3.2 oz (93.1 kg)   LMP 2020   BMI 40.08 kg/m²   Gen: AO, NAD  Abd: Soft, NT  FHT: 135  FH: 36cm, vertex by Leopolds  Ext: Mild LE edema  SVE: fingertip/50/-2    Assessment/Plan:  36 y.o.  at 36w1d (Estimated Date of Delivery: 21) presents for PHILLIP appointment:     Problem List Items Addressed This Visit        Gynecology/Obstetric Problems    Prenatal care, subsequent pregnancy in third trimester - Primary     - FWB: reassuring by micky today  - Genetic screening: S low risk  - Anatomy scan: done 21, suboptimal due to fetal positioning, low lying anterior placenta, normal CL and normal fluid, male fetus    - completion 2/10/21, wnl aside from suboptimal LVOT and sacral spine    - scan 3/11/21 completed  - Tdap: 3/10/21  - PNL: A+/ab-, RI, HepB neg, RPRnr, HIV neg, UDS negative, urine culture negative, GCCT/trich next, Hgb 13.2    - 1hr , Hgb 11.0, plt 173    - GBS negative         Low-lying placenta     Resolved, now >2.7cm from os  - normal appearing placenta/uterine interface             Other    Hypothyroidism     Currently on synthroid 50mcg daily  - TSH  2.17  - TSH 3/11 2.45, T4 0.9  - TSH  2.48, T4 1.0         History of gestational hypertension     Reports prior gHTN in labor  - baseline CBC/CMP wnl  - 24hr urine wnl  - continue daily ASA, will discontinue at next visit         Depression affecting pregnancy     On Cymbalta         History of  delivery     RCD scheduled 21 at 0800         Palpitations     See by cardiology, see notes in epic  - continue daily labetalol and daily baby aspirin  - deferred monitor for now         Family history of ovarian cancer     In multiple paternal aunts  - desires RR salpingectomy at time of CD  - awaiting call back from [employer]: RTC in 1 week  Nickolas Estrada MD

## 2021-05-12 NOTE — ASSESSMENT & PLAN NOTE
Reports prior gHTN in labor  - baseline CBC/CMP wnl  - 24hr urine wnl  - continue daily ASA, will discontinue at next visit

## 2021-05-12 NOTE — ASSESSMENT & PLAN NOTE
- FWB: reassuring by doptones today  - Genetic screening: S low risk  - Anatomy scan: done 1/13/21, suboptimal due to fetal positioning, low lying anterior placenta, normal CL and normal fluid, male fetus    - completion 2/10/21, wnl aside from suboptimal LVOT and sacral spine    - scan 3/11/21 completed  - Tdap: 3/10/21  - PNL: A+/ab-, RI, HepB neg, RPRnr, HIV neg, UDS negative, urine culture negative, GCCT/trich next, Hgb 13.2    - 1hr , Hgb 11.0, plt 173    - GBS negative

## 2021-05-12 NOTE — PROGRESS NOTES
Temp-97.8f infrared  Maternal emotional well being screening form completed and reviewed with patient. Current score is 7. Patient given referral to [address] [place] [address] ([phone]):  No

## 2021-05-18 ENCOUNTER — ROUTINE PRENATAL (OUTPATIENT)
Dept: OBGYN CLINIC | Age: 31
End: 2021-05-18

## 2021-05-18 VITALS
WEIGHT: 205.8 LBS | HEART RATE: 110 BPM | DIASTOLIC BLOOD PRESSURE: 80 MMHG | BODY MASS INDEX: 40.19 KG/M2 | SYSTOLIC BLOOD PRESSURE: 120 MMHG

## 2021-05-18 DIAGNOSIS — O99.340 DEPRESSION AFFECTING PREGNANCY: ICD-10-CM

## 2021-05-18 DIAGNOSIS — Z87.59 HISTORY OF GESTATIONAL HYPERTENSION: ICD-10-CM

## 2021-05-18 DIAGNOSIS — F32.A DEPRESSION AFFECTING PREGNANCY: ICD-10-CM

## 2021-05-18 DIAGNOSIS — Z98.891 HISTORY OF CESAREAN DELIVERY: ICD-10-CM

## 2021-05-18 DIAGNOSIS — Z34.83 PRENATAL CARE, SUBSEQUENT PREGNANCY IN THIRD TRIMESTER: Primary | ICD-10-CM

## 2021-05-18 DIAGNOSIS — O44.40 LOW-LYING PLACENTA: ICD-10-CM

## 2021-05-18 DIAGNOSIS — Z80.41 FAMILY HISTORY OF OVARIAN CANCER: ICD-10-CM

## 2021-05-18 DIAGNOSIS — E03.8 OTHER SPECIFIED HYPOTHYROIDISM: ICD-10-CM

## 2021-05-18 PROCEDURE — 0502F SUBSEQUENT PRENATAL CARE: CPT | Performed by: OBSTETRICS & GYNECOLOGY

## 2021-05-18 NOTE — PROGRESS NOTES
Return OB Office Visit    CC:   Chief Complaint   Patient presents with    Routine Prenatal Visit       HPI:  Pt seen and examined. No concerns/complaints. Denies VB, LOF, +FM. Some contractions still and     Maternal wellness questionnaire reviewed - no concerns today. Score 7.      Objective:  /80   Pulse 110   Wt 205 lb 12.8 oz (93.4 kg)   LMP 2020   BMI 40.19 kg/m²   Gen: AO, NAD  Abd: Soft, NT  FHT: 125  FH: 37cm, vertex by Leopolds  Ext: Mild LE edema    Assessment/Plan:  36 y.o.  at 37w0d (Estimated Date of Delivery: 21) presents for PHILLIP appointment:    Problem List Items Addressed This Visit        Gynecology/Obstetric Problems    Prenatal care, subsequent pregnancy in third trimester - Primary     - FWB: reassuring by micky today  - Genetic screening: S low risk  - Anatomy scan: done 21, suboptimal due to fetal positioning, low lying anterior placenta, normal CL and normal fluid, male fetus    - completion 2/10/21, wnl aside from suboptimal LVOT and sacral spine    - scan 3/11/21 completed  - Tdap: 3/10/21  - PNL: A+/ab-, RI, HepB neg, RPRnr, HIV neg, UDS negative, urine culture negative, GCCT/trich next, Hgb 13.2    - 1hr , Hgb 11.0, plt 173    - GBS negative         Low-lying placenta     Resolved, now >2.7cm from os  - normal appearing placenta/uterine interface             Other    Hypothyroidism     Currently on synthroid 50mcg daily  - TSH  2.17  - TSH 3/11 2.45, T4 0.9  - TSH  2.48, T4 1.0         History of gestational hypertension     Reports prior gHTN in labor  - baseline CBC/CMP wnl  - 24hr urine wnl  - ASA discontinued today         Depression affecting pregnancy     On Cymbalta         History of  delivery     RCD scheduled 21 at 0800         Family history of ovarian cancer     In 2 paternal aunts per pt report  - desires RR salpingectomy at time of CD, approved with AIDA Rogers, will call on day of surgery as well

## 2021-05-19 NOTE — ASSESSMENT & PLAN NOTE
In 2 paternal aunts per pt report  - desires RR salpingectomy at time of CD, approved with AIDA Rogers, will call on day of surgery as well

## 2021-05-26 ENCOUNTER — ROUTINE PRENATAL (OUTPATIENT)
Dept: OBGYN CLINIC | Age: 31
End: 2021-05-26

## 2021-05-26 VITALS
BODY MASS INDEX: 40.78 KG/M2 | HEART RATE: 103 BPM | DIASTOLIC BLOOD PRESSURE: 80 MMHG | SYSTOLIC BLOOD PRESSURE: 130 MMHG | WEIGHT: 208.8 LBS

## 2021-05-26 DIAGNOSIS — Z98.891 HISTORY OF CESAREAN DELIVERY: ICD-10-CM

## 2021-05-26 DIAGNOSIS — O44.40 LOW-LYING PLACENTA: ICD-10-CM

## 2021-05-26 DIAGNOSIS — Z34.83 PRENATAL CARE, SUBSEQUENT PREGNANCY IN THIRD TRIMESTER: ICD-10-CM

## 2021-05-26 DIAGNOSIS — E03.8 OTHER SPECIFIED HYPOTHYROIDISM: ICD-10-CM

## 2021-05-26 DIAGNOSIS — F32.A DEPRESSION AFFECTING PREGNANCY: Primary | ICD-10-CM

## 2021-05-26 DIAGNOSIS — Z87.59 HISTORY OF GESTATIONAL HYPERTENSION: ICD-10-CM

## 2021-05-26 DIAGNOSIS — O99.340 DEPRESSION AFFECTING PREGNANCY: Primary | ICD-10-CM

## 2021-05-26 PROCEDURE — 0502F SUBSEQUENT PRENATAL CARE: CPT | Performed by: OBSTETRICS & GYNECOLOGY

## 2021-05-26 NOTE — PROGRESS NOTES
Return OB Office Visit    CC:   Chief Complaint   Patient presents with    Routine Prenatal Visit       HPI:  Pt seen and examined. No concerns/complaints. Denies VB, LOF, ctx. +FM    Maternal wellness questionnaire reviewed - no concerns today. Score 5.      Objective:  /80   Pulse 103   Wt 208 lb 12.8 oz (94.7 kg)   LMP 2020   BMI 40.78 kg/m²   Gen: AO, NAD  Abd: Soft, NT  FHT: 142  FH: 37cm, vertex by Leopolds  Ext: Mild LE edema    Assessment/Plan:  36 y.o.  at 38w1d (Estimated Date of Delivery: 21) presents for PHILLIP appointment:     Problem List Items Addressed This Visit        Gynecology/Obstetric Problems    Prenatal care, subsequent pregnancy in third trimester     - FWB: reassuring by micky today  - Genetic screening: MQS low risk  - Anatomy scan: done 21, suboptimal due to fetal positioning, low lying anterior placenta, normal CL and normal fluid, male fetus    - completion 2/10/21, wnl aside from suboptimal LVOT and sacral spine    - scan 3/11/21 completed  - Tdap: 3/10/21  - PNL: A+/ab-, RI, HepB neg, RPRnr, HIV neg, UDS negative, urine culture negative, GCCT/trich next, Hgb 13.2    - 1hr , Hgb 11.0, plt 173    - GBS negative         Low-lying placenta     Resolved, now >2.7cm from os  - normal appearing placenta/uterine interface             Other    Hypothyroidism     Currently on synthroid 50mcg daily  - TSH  2.17  - TSH 3/11 2.45, T4 0.9  - TSH  2.48, T4 1.0         History of gestational hypertension     Reports prior gHTN in labor  - baseline CBC/CMP wnl  - 24hr urine wnl  - ASA discontinued at 36 weeks         Depression affecting pregnancy - Primary    History of  delivery     RCD scheduled 21 at 0800               Dispo: RCD  at 4000 Waylon Vitale MD

## 2021-05-26 NOTE — PROGRESS NOTES
Temp-98.6F infrared  Maternal emotional well being screening form completed and reviewed with patient. Current score is 5. Patient given referral to [address] [place] [address] ([phone]):  No

## 2021-05-27 ENCOUNTER — TELEPHONE (OUTPATIENT)
Dept: OBGYN CLINIC | Age: 31
End: 2021-05-27

## 2021-05-27 ENCOUNTER — HOSPITAL ENCOUNTER (OUTPATIENT)
Age: 31
Discharge: HOME | End: 2021-05-27
Attending: OBSTETRICS & GYNECOLOGY | Admitting: OBSTETRICS & GYNECOLOGY
Payer: OTHER GOVERNMENT

## 2021-05-27 VITALS
RESPIRATION RATE: 16 BRPM | DIASTOLIC BLOOD PRESSURE: 78 MMHG | HEART RATE: 110 BPM | TEMPERATURE: 97.8 F | SYSTOLIC BLOOD PRESSURE: 135 MMHG | HEIGHT: 60 IN | BODY MASS INDEX: 40.44 KG/M2 | WEIGHT: 206 LBS

## 2021-05-27 PROCEDURE — 99211 OFF/OP EST MAY X REQ PHY/QHP: CPT

## 2021-05-27 NOTE — TELEPHONE ENCOUNTER
Pt 38w2d. She is calling due to decreased fetal movement. She states she has only felt baby move 1 time today and that was 2 hours ago. She did try hydrating and moving. No vaginal discharge. No LOF that she is aware of. Pt advised to report to Triage Notified triage of send over,On call aware.  DONE

## 2021-05-27 NOTE — ASSESSMENT & PLAN NOTE
Reports prior gHTN in labor  - baseline CBC/CMP wnl  - 24hr urine wnl  - ASA discontinued at 36 weeks

## 2021-05-27 NOTE — H&P
Historical Provider, MD   ondansetron (ZOFRAN-ODT) 4 MG disintegrating tablet Take 1 tablet by mouth every 8 hours as needed for Nausea 21   Tab Murrell MD   [facility] (BREAST PUMP) [facility] 1 Device by Does not apply route as needed (as needed) Breast pump 21   Tab Murrell MD   aspirin (ASPIRIN 81) 81 MG EC tablet Take 1 tablet by mouth daily  Patient not taking: Reported on 20   Tab Murrell MD   Prenatal Multivit-Min-Fe-FA (PRE-VON PO) Take by mouth    Historical Provider, MD   albuterol (VOSPIRE ER) 4 MG extended release tablet Take 4 mg by mouth every 12 hours PRN    Historical Provider, MD          REVIEW OF SYSTEMS:     See HPI     PHYSICAL EXAM:    Vital Signs: Blood pressure 135/78, pulse 110, temperature 97.8 °F (36.6 °C), temperature source Oral, resp. rate 16, height 5' (1.524 m), weight 206 lb (93.4 kg), last menstrual period 2020.     GEN: NAD  ABD: soft/ NTTP/ gravid  EXT: nontender    Fetal heart rate:   135/mod faisal/ (+) accels - reactive     Cervix:  n/a  Contraction frequency:  irregular      ASSESSMENT/PLAN:    38w2d decreased FM   Feeling better movement now   Reactive NST   Dc home per Dr. Rohit Camacho , who was updated       Sebas Milton MD  2021

## 2021-06-01 ENCOUNTER — ANESTHESIA EVENT (OUTPATIENT)
Dept: LABOR AND DELIVERY | Age: 31
End: 2021-06-01
Payer: OTHER GOVERNMENT

## 2021-06-01 ENCOUNTER — HOSPITAL ENCOUNTER (INPATIENT)
Age: 31
LOS: 2 days | Discharge: HOME | End: 2021-06-03
Attending: OBSTETRICS & GYNECOLOGY | Admitting: OBSTETRICS & GYNECOLOGY
Payer: OTHER GOVERNMENT

## 2021-06-01 ENCOUNTER — ANESTHESIA (OUTPATIENT)
Dept: LABOR AND DELIVERY | Age: 31
End: 2021-06-01
Payer: OTHER GOVERNMENT

## 2021-06-01 VITALS — DIASTOLIC BLOOD PRESSURE: 85 MMHG | SYSTOLIC BLOOD PRESSURE: 142 MMHG | OXYGEN SATURATION: 98 %

## 2021-06-01 DIAGNOSIS — Z98.891 S/P REPEAT LOW TRANSVERSE C-SECTION: Primary | ICD-10-CM

## 2021-06-01 LAB
ABO/RH: NORMAL
AMPHETAMINE SCREEN, URINE: NORMAL
ANTIBODY SCREEN: NORMAL
BARBITURATE SCREEN URINE: NORMAL
BENZODIAZEPINE SCREEN, URINE: NORMAL
CANNABINOID SCREEN URINE: NORMAL
COCAINE METABOLITE SCREEN URINE: NORMAL
HCT VFR BLD CALC: 36.7 % (ref 36–48)
HEMOGLOBIN: 12.4 G/DL (ref 12–16)
Lab: NORMAL
MCH RBC QN AUTO: 29.8 PG (ref 26–34)
MCHC RBC AUTO-ENTMCNC: 33.7 G/DL (ref 31–36)
MCV RBC AUTO: 88.5 FL (ref 80–100)
METHADONE SCREEN, URINE: NORMAL
OPIATE SCREEN URINE: NORMAL
OXYCODONE URINE: NORMAL
PDW BLD-RTO: 15.4 % (ref 12.4–15.4)
PH UA: 6
PHENCYCLIDINE SCREEN URINE: NORMAL
PLATELET # BLD: 163 K/UL (ref 135–450)
PMV BLD AUTO: 10.2 FL (ref 5–10.5)
PROPOXYPHENE SCREEN: NORMAL
RBC # BLD: 4.15 M/UL (ref 4–5.2)
TOTAL SYPHILLIS IGG/IGM: NORMAL
WBC # BLD: 6.8 K/UL (ref 4–11)

## 2021-06-01 PROCEDURE — 94640 AIRWAY INHALATION TREATMENT: CPT

## 2021-06-01 PROCEDURE — 6360000002 HC RX W HCPCS: Performed by: OBSTETRICS & GYNECOLOGY

## 2021-06-01 PROCEDURE — 59510 CESAREAN DELIVERY: CPT | Performed by: OBSTETRICS & GYNECOLOGY

## 2021-06-01 PROCEDURE — 2500000003 HC RX 250 WO HCPCS: Performed by: NURSE ANESTHETIST, CERTIFIED REGISTERED

## 2021-06-01 PROCEDURE — 88302 TISSUE EXAM BY PATHOLOGIST: CPT

## 2021-06-01 PROCEDURE — 86900 BLOOD TYPING SEROLOGIC ABO: CPT

## 2021-06-01 PROCEDURE — 80307 DRUG TEST PRSMV CHEM ANLYZR: CPT

## 2021-06-01 PROCEDURE — 2580000003 HC RX 258: Performed by: OBSTETRICS & GYNECOLOGY

## 2021-06-01 PROCEDURE — 2580000003 HC RX 258: Performed by: NURSE ANESTHETIST, CERTIFIED REGISTERED

## 2021-06-01 PROCEDURE — 6360000002 HC RX W HCPCS: Performed by: NURSE ANESTHETIST, CERTIFIED REGISTERED

## 2021-06-01 PROCEDURE — 85027 COMPLETE CBC AUTOMATED: CPT

## 2021-06-01 PROCEDURE — 3700000001 HC ADD 15 MINUTES (ANESTHESIA): Performed by: OBSTETRICS & GYNECOLOGY

## 2021-06-01 PROCEDURE — 86780 TREPONEMA PALLIDUM: CPT

## 2021-06-01 PROCEDURE — 7100000000 HC PACU RECOVERY - FIRST 15 MIN: Performed by: OBSTETRICS & GYNECOLOGY

## 2021-06-01 PROCEDURE — 86901 BLOOD TYPING SEROLOGIC RH(D): CPT

## 2021-06-01 PROCEDURE — 6370000000 HC RX 637 (ALT 250 FOR IP): Performed by: OBSTETRICS & GYNECOLOGY

## 2021-06-01 PROCEDURE — 7100000001 HC PACU RECOVERY - ADDTL 15 MIN: Performed by: OBSTETRICS & GYNECOLOGY

## 2021-06-01 PROCEDURE — 3700000000 HC ANESTHESIA ATTENDED CARE: Performed by: OBSTETRICS & GYNECOLOGY

## 2021-06-01 PROCEDURE — 58611 LIGATE OVIDUCT(S) ADD-ON: CPT | Performed by: OBSTETRICS & GYNECOLOGY

## 2021-06-01 PROCEDURE — 3609079900 HC CESAREAN SECTION: Performed by: OBSTETRICS & GYNECOLOGY

## 2021-06-01 PROCEDURE — 0UB70ZZ EXCISION OF BILATERAL FALLOPIAN TUBES, OPEN APPROACH: ICD-10-PCS | Performed by: OBSTETRICS & GYNECOLOGY

## 2021-06-01 PROCEDURE — 2709999900 HC NON-CHARGEABLE SUPPLY: Performed by: OBSTETRICS & GYNECOLOGY

## 2021-06-01 PROCEDURE — 1220000000 HC SEMI PRIVATE OB R&B

## 2021-06-01 PROCEDURE — 86850 RBC ANTIBODY SCREEN: CPT

## 2021-06-01 RX ORDER — SODIUM CHLORIDE 0.9 % (FLUSH) 0.9 %
10 SYRINGE (ML) INJECTION EVERY 12 HOURS SCHEDULED
Status: DISCONTINUED | OUTPATIENT
Start: 2021-06-01 | End: 2021-06-03 | Stop reason: HOSPADM

## 2021-06-01 RX ORDER — BUDESONIDE AND FORMOTEROL FUMARATE DIHYDRATE 160; 4.5 UG/1; UG/1
2 AEROSOL RESPIRATORY (INHALATION) 2 TIMES DAILY
Status: DISCONTINUED | OUTPATIENT
Start: 2021-06-01 | End: 2021-06-03 | Stop reason: HOSPADM

## 2021-06-01 RX ORDER — SODIUM CHLORIDE 0.9 % (FLUSH) 0.9 %
10 SYRINGE (ML) INJECTION EVERY 12 HOURS SCHEDULED
Status: DISCONTINUED | OUTPATIENT
Start: 2021-06-01 | End: 2021-06-01

## 2021-06-01 RX ORDER — DULOXETIN HYDROCHLORIDE 30 MG/1
30 CAPSULE, DELAYED RELEASE ORAL DAILY
Status: DISCONTINUED | OUTPATIENT
Start: 2021-06-02 | End: 2021-06-03 | Stop reason: HOSPADM

## 2021-06-01 RX ORDER — ONDANSETRON 2 MG/ML
4 INJECTION INTRAMUSCULAR; INTRAVENOUS EVERY 6 HOURS PRN
Status: DISCONTINUED | OUTPATIENT
Start: 2021-06-01 | End: 2021-06-01

## 2021-06-01 RX ORDER — LABETALOL 100 MG/1
50 TABLET, FILM COATED ORAL EVERY 12 HOURS SCHEDULED
Status: DISCONTINUED | OUTPATIENT
Start: 2021-06-02 | End: 2021-06-01

## 2021-06-01 RX ORDER — SODIUM CHLORIDE, SODIUM LACTATE, POTASSIUM CHLORIDE, CALCIUM CHLORIDE 600; 310; 30; 20 MG/100ML; MG/100ML; MG/100ML; MG/100ML
INJECTION, SOLUTION INTRAVENOUS CONTINUOUS
Status: DISCONTINUED | OUTPATIENT
Start: 2021-06-01 | End: 2021-06-03 | Stop reason: HOSPADM

## 2021-06-01 RX ORDER — FERROUS SULFATE 325(65) MG
325 TABLET ORAL
Status: DISCONTINUED | OUTPATIENT
Start: 2021-06-01 | End: 2021-06-03 | Stop reason: HOSPADM

## 2021-06-01 RX ORDER — OXYCODONE HYDROCHLORIDE AND ACETAMINOPHEN 5; 325 MG/1; MG/1
2 TABLET ORAL EVERY 4 HOURS PRN
Status: DISCONTINUED | OUTPATIENT
Start: 2021-06-01 | End: 2021-06-03 | Stop reason: ALTCHOICE

## 2021-06-01 RX ORDER — ONDANSETRON 2 MG/ML
INJECTION INTRAMUSCULAR; INTRAVENOUS PRN
Status: DISCONTINUED | OUTPATIENT
Start: 2021-06-01 | End: 2021-06-01 | Stop reason: SDUPTHER

## 2021-06-01 RX ORDER — OXYTOCIN 10 [USP'U]/ML
INJECTION, SOLUTION INTRAMUSCULAR; INTRAVENOUS PRN
Status: DISCONTINUED | OUTPATIENT
Start: 2021-06-01 | End: 2021-06-01 | Stop reason: SDUPTHER

## 2021-06-01 RX ORDER — LANOLIN 100 %
OINTMENT (GRAM) TOPICAL
Status: DISCONTINUED | OUTPATIENT
Start: 2021-06-01 | End: 2021-06-03 | Stop reason: HOSPADM

## 2021-06-01 RX ORDER — BUPIVACAINE HYDROCHLORIDE 7.5 MG/ML
INJECTION, SOLUTION INTRASPINAL PRN
Status: DISCONTINUED | OUTPATIENT
Start: 2021-06-01 | End: 2021-06-01 | Stop reason: SDUPTHER

## 2021-06-01 RX ORDER — ACETAMINOPHEN 325 MG/1
650 TABLET ORAL EVERY 4 HOURS PRN
Status: DISCONTINUED | OUTPATIENT
Start: 2021-06-01 | End: 2021-06-03 | Stop reason: HOSPADM

## 2021-06-01 RX ORDER — SIMETHICONE 80 MG
80 TABLET,CHEWABLE ORAL EVERY 6 HOURS PRN
Status: DISCONTINUED | OUTPATIENT
Start: 2021-06-01 | End: 2021-06-03 | Stop reason: HOSPADM

## 2021-06-01 RX ORDER — OXYCODONE HYDROCHLORIDE AND ACETAMINOPHEN 5; 325 MG/1; MG/1
1 TABLET ORAL EVERY 4 HOURS PRN
Status: DISCONTINUED | OUTPATIENT
Start: 2021-06-01 | End: 2021-06-03 | Stop reason: ALTCHOICE

## 2021-06-01 RX ORDER — SODIUM CHLORIDE 0.9 % (FLUSH) 0.9 %
10 SYRINGE (ML) INJECTION PRN
Status: DISCONTINUED | OUTPATIENT
Start: 2021-06-01 | End: 2021-06-03 | Stop reason: HOSPADM

## 2021-06-01 RX ORDER — PROMETHAZINE HYDROCHLORIDE 25 MG/ML
INJECTION, SOLUTION INTRAMUSCULAR; INTRAVENOUS PRN
Status: DISCONTINUED | OUTPATIENT
Start: 2021-06-01 | End: 2021-06-01 | Stop reason: SDUPTHER

## 2021-06-01 RX ORDER — SODIUM CHLORIDE, SODIUM LACTATE, POTASSIUM CHLORIDE, CALCIUM CHLORIDE 600; 310; 30; 20 MG/100ML; MG/100ML; MG/100ML; MG/100ML
INJECTION, SOLUTION INTRAVENOUS CONTINUOUS PRN
Status: DISCONTINUED | OUTPATIENT
Start: 2021-06-01 | End: 2021-06-01 | Stop reason: SDUPTHER

## 2021-06-01 RX ORDER — LEVOTHYROXINE SODIUM 0.03 MG/1
50 TABLET ORAL DAILY
Status: DISCONTINUED | OUTPATIENT
Start: 2021-06-02 | End: 2021-06-03 | Stop reason: HOSPADM

## 2021-06-01 RX ORDER — LABETALOL 100 MG/1
50 TABLET, FILM COATED ORAL EVERY 12 HOURS SCHEDULED
Status: DISCONTINUED | OUTPATIENT
Start: 2021-06-01 | End: 2021-06-03 | Stop reason: HOSPADM

## 2021-06-01 RX ORDER — IBUPROFEN 800 MG/1
800 TABLET ORAL EVERY 8 HOURS PRN
Status: DISCONTINUED | OUTPATIENT
Start: 2021-06-01 | End: 2021-06-03 | Stop reason: HOSPADM

## 2021-06-01 RX ORDER — ONDANSETRON 4 MG/1
4 TABLET, ORALLY DISINTEGRATING ORAL EVERY 8 HOURS PRN
Status: DISCONTINUED | OUTPATIENT
Start: 2021-06-01 | End: 2021-06-03 | Stop reason: HOSPADM

## 2021-06-01 RX ORDER — MORPHINE SULFATE 0.5 MG/ML
INJECTION, SOLUTION EPIDURAL; INTRATHECAL; INTRAVENOUS PRN
Status: DISCONTINUED | OUTPATIENT
Start: 2021-06-01 | End: 2021-06-01 | Stop reason: SDUPTHER

## 2021-06-01 RX ORDER — SODIUM CHLORIDE, SODIUM LACTATE, POTASSIUM CHLORIDE, CALCIUM CHLORIDE 600; 310; 30; 20 MG/100ML; MG/100ML; MG/100ML; MG/100ML
INJECTION, SOLUTION INTRAVENOUS CONTINUOUS
Status: DISCONTINUED | OUTPATIENT
Start: 2021-06-01 | End: 2021-06-01

## 2021-06-01 RX ORDER — PRENATAL WITH FERROUS FUM AND FOLIC ACID 3080; 920; 120; 400; 22; 1.84; 3; 20; 10; 1; 12; 200; 27; 25; 2 [IU]/1; [IU]/1; MG/1; [IU]/1; MG/1; MG/1; MG/1; MG/1; MG/1; MG/1; UG/1; MG/1; MG/1; MG/1; MG/1
1 TABLET ORAL DAILY
Status: DISCONTINUED | OUTPATIENT
Start: 2021-06-01 | End: 2021-06-03 | Stop reason: HOSPADM

## 2021-06-01 RX ORDER — BUDESONIDE AND FORMOTEROL FUMARATE DIHYDRATE 160; 4.5 UG/1; UG/1
2 AEROSOL RESPIRATORY (INHALATION) 2 TIMES DAILY
Status: DISCONTINUED | OUTPATIENT
Start: 2021-06-01 | End: 2021-06-01

## 2021-06-01 RX ORDER — SODIUM CHLORIDE 9 MG/ML
25 INJECTION, SOLUTION INTRAVENOUS PRN
Status: DISCONTINUED | OUTPATIENT
Start: 2021-06-01 | End: 2021-06-03 | Stop reason: HOSPADM

## 2021-06-01 RX ORDER — KETOROLAC TROMETHAMINE 30 MG/ML
30 INJECTION, SOLUTION INTRAMUSCULAR; INTRAVENOUS EVERY 6 HOURS PRN
Status: DISCONTINUED | OUTPATIENT
Start: 2021-06-01 | End: 2021-06-03 | Stop reason: HOSPADM

## 2021-06-01 RX ORDER — SODIUM CHLORIDE, SODIUM LACTATE, POTASSIUM CHLORIDE, AND CALCIUM CHLORIDE .6; .31; .03; .02 G/100ML; G/100ML; G/100ML; G/100ML
1000 INJECTION, SOLUTION INTRAVENOUS ONCE
Status: COMPLETED | OUTPATIENT
Start: 2021-06-01 | End: 2021-06-01

## 2021-06-01 RX ORDER — DOCUSATE SODIUM 100 MG/1
100 CAPSULE, LIQUID FILLED ORAL 2 TIMES DAILY
Status: DISCONTINUED | OUTPATIENT
Start: 2021-06-01 | End: 2021-06-03 | Stop reason: HOSPADM

## 2021-06-01 RX ORDER — EPHEDRINE SULFATE 50 MG/ML
INJECTION INTRAVENOUS PRN
Status: DISCONTINUED | OUTPATIENT
Start: 2021-06-01 | End: 2021-06-01 | Stop reason: SDUPTHER

## 2021-06-01 RX ORDER — SODIUM CHLORIDE 0.9 % (FLUSH) 0.9 %
10 SYRINGE (ML) INJECTION PRN
Status: DISCONTINUED | OUTPATIENT
Start: 2021-06-01 | End: 2021-06-01

## 2021-06-01 RX ORDER — SODIUM CHLORIDE 9 MG/ML
25 INJECTION, SOLUTION INTRAVENOUS PRN
Status: DISCONTINUED | OUTPATIENT
Start: 2021-06-01 | End: 2021-06-01

## 2021-06-01 RX ADMIN — ACETAMINOPHEN 650 MG: 325 TABLET ORAL at 14:48

## 2021-06-01 RX ADMIN — ONDANSETRON 4 MG: 2 INJECTION INTRAMUSCULAR; INTRAVENOUS at 08:56

## 2021-06-01 RX ADMIN — ACETAMINOPHEN 650 MG: 325 TABLET ORAL at 22:33

## 2021-06-01 RX ADMIN — DOCUSATE SODIUM 100 MG: 100 CAPSULE, LIQUID FILLED ORAL at 22:33

## 2021-06-01 RX ADMIN — KETOROLAC TROMETHAMINE 30 MG: 30 INJECTION, SOLUTION INTRAMUSCULAR at 10:28

## 2021-06-01 RX ADMIN — SODIUM CHLORIDE, POTASSIUM CHLORIDE, SODIUM LACTATE AND CALCIUM CHLORIDE 1000 ML: 600; 310; 30; 20 INJECTION, SOLUTION INTRAVENOUS at 06:15

## 2021-06-01 RX ADMIN — SODIUM CHLORIDE, POTASSIUM CHLORIDE, SODIUM LACTATE AND CALCIUM CHLORIDE: 600; 310; 30; 20 INJECTION, SOLUTION INTRAVENOUS at 06:50

## 2021-06-01 RX ADMIN — SODIUM CHLORIDE, SODIUM LACTATE, POTASSIUM CHLORIDE, AND CALCIUM CHLORIDE: .6; .31; .03; .02 INJECTION, SOLUTION INTRAVENOUS at 08:06

## 2021-06-01 RX ADMIN — OXYTOCIN 10 UNITS: 10 INJECTION INTRAVENOUS at 08:49

## 2021-06-01 RX ADMIN — LABETALOL HYDROCHLORIDE 50 MG: 100 TABLET, FILM COATED ORAL at 22:33

## 2021-06-01 RX ADMIN — EPHEDRINE SULFATE 25 MG: 50 INJECTION INTRAVENOUS at 08:03

## 2021-06-01 RX ADMIN — IBUPROFEN 800 MG: 800 TABLET, FILM COATED ORAL at 18:23

## 2021-06-01 RX ADMIN — OXYTOCIN 20 UNITS: 10 INJECTION INTRAVENOUS at 08:21

## 2021-06-01 RX ADMIN — BUPIVACAINE HYDROCHLORIDE 1.6 ML: 7.5 INJECTION, SOLUTION SUBARACHNOID at 07:55

## 2021-06-01 RX ADMIN — EPHEDRINE SULFATE 25 MG: 50 INJECTION INTRAVENOUS at 08:02

## 2021-06-01 RX ADMIN — MORPHINE SULFATE 0.2 MG: 0.5 INJECTION EPIDURAL; INTRATHECAL; INTRAVENOUS at 07:55

## 2021-06-01 RX ADMIN — Medication 10 ML: at 22:34

## 2021-06-01 RX ADMIN — Medication 2 PUFF: at 22:02

## 2021-06-01 RX ADMIN — SODIUM CHLORIDE, SODIUM LACTATE, POTASSIUM CHLORIDE, AND CALCIUM CHLORIDE: .6; .31; .03; .02 INJECTION, SOLUTION INTRAVENOUS at 07:50

## 2021-06-01 RX ADMIN — PROMETHAZINE HYDROCHLORIDE 12.5 MG: 25 INJECTION INTRAMUSCULAR; INTRAVENOUS at 08:58

## 2021-06-01 RX ADMIN — CEFAZOLIN 2000 MG: 10 INJECTION, POWDER, FOR SOLUTION INTRAVENOUS at 08:05

## 2021-06-01 RX ADMIN — SODIUM CHLORIDE, SODIUM LACTATE, POTASSIUM CHLORIDE, AND CALCIUM CHLORIDE: .6; .31; .03; .02 INJECTION, SOLUTION INTRAVENOUS at 08:49

## 2021-06-01 ASSESSMENT — PULMONARY FUNCTION TESTS
PIF_VALUE: 0
PIF_VALUE: 1
PIF_VALUE: 0
PIF_VALUE: 1
PIF_VALUE: 0
PIF_VALUE: 1
PIF_VALUE: 0
PIF_VALUE: 1
PIF_VALUE: 0
PIF_VALUE: 1
PIF_VALUE: 0
PIF_VALUE: 1
PIF_VALUE: 0
PIF_VALUE: 0
PIF_VALUE: 1
PIF_VALUE: 0
PIF_VALUE: 1
PIF_VALUE: 0
PIF_VALUE: 1
PIF_VALUE: 0
PIF_VALUE: 0
PIF_VALUE: 1
PIF_VALUE: 1
PIF_VALUE: 0
PIF_VALUE: 0
PIF_VALUE: 1
PIF_VALUE: 0
PIF_VALUE: 1
PIF_VALUE: 0
PIF_VALUE: 0
PIF_VALUE: 1
PIF_VALUE: 0
PIF_VALUE: 1
PIF_VALUE: 0
PIF_VALUE: 0
PIF_VALUE: 1
PIF_VALUE: 0

## 2021-06-01 ASSESSMENT — PAIN SCALES - GENERAL
PAINLEVEL_OUTOF10: 6
PAINLEVEL_OUTOF10: 5
PAINLEVEL_OUTOF10: 5
PAINLEVEL_OUTOF10: 7

## 2021-06-01 NOTE — PROGRESS NOTES
Pt assisted by 2 staff members to restroom for first time get up. Pt denies dizziness or lightheadedness. Gait steady. Woods catheter in place. Pericare done by pt with RN instruction. New ice pack, pad and panties put on pt. Gown changed. Hand hygiene done. Complete linen change done and comfort pad put on bed. Pt tolerated well.

## 2021-06-01 NOTE — LACTATION NOTE
This note was copied from a baby's chart. Placed name and phone number written on white board in room. Patient and FOB are currently sleeping. [facility] spoke with RN and she will let the patient know that [facility] is available and to call for any needs.

## 2021-06-01 NOTE — OP NOTE
Department of Obstetrics and Gynecology   Section Note    Pre-operative Diagnosis:  1. Intrauterine pregnancy at 39 week 0 day pregnancy. 2.  Prior CD x2  3. Family history of ovarian cancer    Post-operative Diagnosis:   1. S/P RCD + bilateral risk reducing salpingectomy  2. Living  infant(s) and Male    Procedure: Repeat low transverse  section and bilateral salpingectomy    Surgeon: Atif Flaherty MD    Assistant: see OR record    Findings: Patient had normal appearing uterus and ovaries. Small filmy adhesions noted at bilateral tubal fimbria but otherwise normal tubes. She had minimal intraabdominal adhesive disease. Delivered a viable male infant, apgars 5/9 and weight 3705g. EBL: 900mL    IVF: 2000mL    UOP: see OR record    Specimens: Bilateral fallopian tubes    Complications:  none     Indication and Consent: The patient is a 36 y.o. Daun Shape who presented for scheduled RCD and salpingectomy. She was counseled on the risks, benefits, and alternatives of the procedure and desired to proceed. All questions were answered and informed consent was obtained prior to proceeding to the operating. Procedure Details   The patient was taken to the Operating Room, identified as Jomar Mathias and the procedure verified as  Delivery. A Time Out was held and the above information confirmed. After induction of anesthesia, the patient was draped and prepped in the usual sterile manner. A Pfannenstiel skin incision was made and carried down through the subcutaneous tissue to the fascia. Fascial incision was made and extended laterally. The fascia was  from the underlying rectus tissue superiorly and inferiorly both bluntly and sharply. The peritoneum was identified and entered bluntly. Peritoneal incision was extended longitudinally. The utero-vesical peritoneal reflection was incised transversely and the bladder flap was bluntly freed from the lower uterine segment.  A low transverse uterine incision was made and extended bluntly. The infant's head was then grasped and brought to the level of the hysterotomy and delivered without difficulty with fundal pressure. A male infant was delivered weighing 3705g grams with Apgar scores of 5 at one minute and 9 at five minutes. After the umbilical cord was clamped and cut cord blood was obtained for evaluation. The placenta was removed intact with fundal massage and appeared normal. The uterus was then exteriorized and the uterine outline, tubes and ovaries appeared normal. The inside of the uterus was wiped with moist laparotomy sponges. The uterine incision was then closed with running locked sutures of 0 Vicryl and a second layer of 0 Vicryl suture in an imbricating stitch. Hemostasis was observed. Attention was then turned to the right fallopian tube. This was grasped with darron clamps and filmy adhesions were taken down with the Bovie. Several windows were made in the avascular portion of the mesosalpinx and the mesosalpinx was sequentially tied and cut with 0 chromic suture. The tube was then similarly tied and cut at the fundus and was then free from all attachments and sent to pathology. The left tube was similarly identified, tied and cut until free. Hemostasis was assured bilaterally. Uterus, tubes and ovaries were then returned to the abdominal cavity. The gutters were cleaned with moist laparotomy sponges and hemostasis was again noted. Peritoneum was then reapproximated with a single figure of eight with 2-0 Vicryl and the fascia was then reapproximated with running sutures of 0 Vicryl. Subcutaneous tissue was closed using 3-0 Vicryl in a running fashion and skin was reapproximated with 3-0 Monocryl. The patient tolerated the procedure well and instrument, sponge, and needle counts were correct x2 prior the abdominal closure and at the conclusion of the case. Disposition: PACU - hemodynamically stable. Condition: stable    Attending Attestation: I performed the procedure.     Casandra Canut MD

## 2021-06-01 NOTE — ANESTHESIA PRE PROCEDURE
Department of Anesthesiology  Preprocedure Note       Name:  Prince Nicholas   Age:  36 y.o.  :  1990                                          MRN:  [id number]         Date:  2021      Surgeon: Adi Diaz):  Angeline Campos MD    Procedure: Procedure(s):   SECTION    Medications prior to admission:   Prior to Admission medications    Medication Sig Start Date End Date Taking? Authorizing Provider   Fluticasone Propionate (FLONASE NA) by Nasal route   Yes Historical Provider, MD   labetalol (NORMODYNE) 100 MG tablet Take 0.5 tablets by mouth every 12 hours 4/10/21  Yes Angeline Campos MD   levothyroxine (SYNTHROID) 50 MCG tablet Take 1 tablet by mouth Daily 21  Yes Angeline Campos MD   aspirin (ASPIRIN 81) 81 MG EC tablet Take 1 tablet by mouth daily 20  Yes Angeline Campos MD   Prenatal Multivit-Min-Fe-FA (PRE-VON PO) Take by mouth   Yes Historical Provider, MD   budesonide-formoterol (SYMBICORT) 160-4.5 MCG/ACT AERO Inhale 2 puffs into the lungs 2 times daily   Yes Historical Provider, MD   DULoxetine (CYMBALTA) 30 MG extended release capsule Take 30 mg by mouth daily   Yes Historical Provider, MD   ondansetron (ZOFRAN-ODT) 4 MG disintegrating tablet Take 1 tablet by mouth every 8 hours as needed for Nausea 21   MD Asuncion Dent.  Devices (BREAST PUMP) [facility] 1 Device by Does not apply route as needed (as needed) Breast pump 21   Angeline Campos MD   albuterol (VOSPIRE ER) 4 MG extended release tablet Take 4 mg by mouth every 12 hours PRN    Historical Provider, MD       Current medications:    Current Facility-Administered Medications   Medication Dose Route Frequency Provider Last Rate Last Admin    lactated ringers infusion   Intravenous Continuous Angeline Campos  mL/hr at 21 0650 New Bag at 21 0650    sodium chloride flush 0.9 % injection 10 mL  10 mL Intravenous 2 times per day Angeline Campos MD        sodium chloride flush 0.9 % injection 10 mL  10 mL Intravenous PRN Fátima Beaver MD        0.9 % sodium chloride infusion  25 mL Intravenous PRN Fátima Beaver MD        ondansetron [facility] injection 4 mg  4 mg Intravenous Q6H PRN Fátima Beaver MD        ceFAZolin (ANCEF) 2000 mg in dextrose 5 % 100 mL IVPB  2,000 mg Intravenous Once Fátima Beaver MD           Allergies:     Allergies   Allergen Reactions    Bactrim [Sulfamethoxazole-Trimethoprim] Hives       Problem List:    Patient Active Problem List   Diagnosis Code    Hypothyroidism E03.9    Prenatal care, subsequent pregnancy in third trimester Z34.83    History of gestational hypertension Z87.59    Depression affecting pregnancy O99.340, F32.9    Mild intermittent asthma without complication X09.44    History of  delivery Z98.891    Low-lying placenta O44.40    Abnormal EKG R94.31    Tachycardia R00.0    Palpitations R00.2    Fatigue R53.83    Family history of ovarian cancer Z80.41     uterine contractions O47.9       Past Medical History:        Diagnosis Date    ACL injury tear     patient reports injury and ortho unable to find right ACL    Asthma     Depression     History of blood transfusion     Hypothyroid     Postpartum depression     PTSD (post-traumatic stress disorder)     taking duloxetine    Tachycardia     take 50mg Labetalol BID       Past Surgical History:        Procedure Laterality Date    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     SECTION      UMBILICAL HERNIA REPAIR         Social History:    Social History     Tobacco Use    Smoking status: Never Smoker    Smokeless tobacco: Never Used   Substance Use Topics    Alcohol use: Not Currently     Alcohol/week: 1.0 standard drinks     Types: 1 Glasses of wine per week                                Counseling given: Not Answered      Vital Signs (Current):   Vitals:    21 0618   BP: 123/74   Pulse: 100   Resp: 16   Temp: 36.7 °C (98.1 °F)   TempSrc: Oral   Weight: 206 lb (93.4 kg)   Height: 5' (1.524 m)                                              BP Readings from Last 3 Encounters:   06/01/21 123/74   05/27/21 135/78   05/26/21 130/80       NPO Status: Time of last liquid consumption: 2230                        Time of last solid consumption: 2230                        Date of last liquid consumption: 05/31/21                        Date of last solid food consumption: 05/31/21    BMI:   Wt Readings from Last 3 Encounters:   06/01/21 206 lb (93.4 kg)   05/27/21 206 lb (93.4 kg)   05/26/21 208 lb 12.8 oz (94.7 kg)     Body mass index is 40.23 kg/m². CBC:   Lab Results   Component Value Date    WBC 6.8 06/01/2021    RBC 4.15 06/01/2021    HGB 12.4 06/01/2021    HCT 36.7 06/01/2021    MCV 88.5 06/01/2021    RDW 15.4 06/01/2021     06/01/2021       CMP:   Lab Results   Component Value Date     04/10/2021    K 3.8 04/10/2021     04/10/2021    CO2 25 04/10/2021    BUN 8 04/10/2021    CREATININE <0.5 04/10/2021    GFRAA >60 04/10/2021    AGRATIO 1.2 04/10/2021    LABGLOM >60 04/10/2021    GLUCOSE 89 04/10/2021    PROT 5.4 04/10/2021    CALCIUM 8.4 04/10/2021    BILITOT <0.2 04/10/2021    ALKPHOS 93 04/10/2021    AST 19 04/10/2021    ALT 13 04/10/2021       POC Tests: No results for input(s): POCGLU, POCNA, POCK, POCCL, POCBUN, POCHEMO, POCHCT in the last 72 hours.     Coags: No results found for: PROTIME, INR, APTT    HCG (If Applicable):   Lab Results   Component Value Date    PREGTESTUR positive 10/26/2020        ABGs: No results found for: PHART, PO2ART, MIK4FMT, TZC4WDF, BEART, K7UEUKIH     Type & Screen (If Applicable):  No results found for: LABABO, LABRH    Drug/Infectious Status (If Applicable):  No results found for: HIV, HEPCAB    COVID-19 Screening (If Applicable): No results found for: COVID19        Anesthesia Evaluation   no history of anesthetic complications:   Airway: Mallampati: III        Dental: normal exam         Pulmonary:normal exam    (+) asthma:                            Cardiovascular:    (+) dysrhythmias (During pregnancy): SVT,                   Neuro/Psych:   (+) psychiatric history (Depression, PTSD, Post partum depression,):            GI/Hepatic/Renal: Neg GI/Hepatic/Renal ROS            Endo/Other: Negative Endo/Other ROS   (+) hypothyroidism::., .                 Abdominal:   (+) obese,         Vascular: negative vascular ROS. Anesthesia Plan      spinal     ASA 3 - emergent             Anesthetic plan and risks discussed with patient and spouse. Plan discussed with attending.                   Fallon Willoughby, APRN - CRNA   6/1/2021

## 2021-06-01 NOTE — PROGRESS NOTES
Report received from Peter Butler. Bedside report given. Introduced myself to pt as her RN for the day. I put my name and phone number on the white board and showed pt how to use her room phone to get a hold of me. Pt was given her plan of care for the day. Call light within reach. Bed in lowest position and wheels are locked. Pt verbalized understanding and denies any further needs at this time. Continue to monitor.

## 2021-06-01 NOTE — ANESTHESIA PROCEDURE NOTES
Spinal Block    Patient location during procedure: OB  Start time: 6/1/2021 7:50 AM  End time: 6/1/2021 7:55 AM  Reason for block: primary anesthetic  Staffing  Performed: resident/CRNA   Resident/CRNA: TIAN Canseco CRNA  Preanesthetic Checklist  Completed: patient identified, IV checked, site marked, risks and benefits discussed, surgical consent, monitors and equipment checked, pre-op evaluation, timeout performed, anesthesia consent given, oxygen available and patient being monitored  Spinal Block  Patient position: sitting  Prep: Betadine  Patient monitoring: cardiac monitor, frequent blood pressure checks and continuous pulse ox  Approach: midline  Location: L3/L4  Provider prep: mask and sterile gloves  Local infiltration: lidocaine  Agent: bupivacaine  Adjuvant: duramorph (0.2mg intrathecally)  Dose: 1.6  Dose: 1.6  Needle  Needle type: Pencan   Needle gauge: 24 G  Needle length: 4 in  Assessment  Sensory level: T6  Swirl obtained: Yes  CSF: clear  Attempts: 1  Hemodynamics: stable  Additional Notes  Pt.  Sitting, sterile prep/drape, 1%Lido @ L3-4, 24ga pencil point needle inserted via introducer, positive clear, free flowing CSF x 4 quad, 1.6cc 0.75% spinal marcaine and duramorph 0.2mg intrathecally

## 2021-06-01 NOTE — PLAN OF CARE
Problem: Anxiety:  Goal: Level of anxiety will decrease  Description: Level of anxiety will decrease  Outcome: Ongoing     Problem: Aspiration - Risk of:  Goal: Absence of aspiration  Description: Absence of aspiration  Outcome: Ongoing     Problem: Tissue Perfusion - Uteroplacental, Altered:  Goal: Absence of abnormal fetal heart rate pattern  Description: Absence of abnormal fetal heart rate pattern  Outcome: Ongoing     Problem: Venous Thromboembolism - Risk of:  Goal: Will show no signs or symptoms of venous thromboembolism  Description: Will show no signs or symptoms of venous thromboembolism  Outcome: Ongoing     Problem: Pain:  Goal: Pain level will decrease  Description: Pain level will decrease  Outcome: Ongoing  Goal: Control of acute pain  Description: Control of acute pain  Outcome: Ongoing  Goal: Control of chronic pain  Description: Control of chronic pain  Outcome: Ongoing     Problem: Discharge Planning:  Goal: Discharged to appropriate level of care  Description: Discharged to appropriate level of care  Outcome: Ongoing     Problem: Fluid Volume - Imbalance:  Goal: Absence of postpartum hemorrhage signs and symptoms  Description: Absence of postpartum hemorrhage signs and symptoms  Outcome: Ongoing     Problem: Infection - Surgical Site:  Goal: Will show no infection signs and symptoms  Description: Will show no infection signs and symptoms  Outcome: Ongoing     Problem: Mood - Altered:  Goal: Mood stable  Description: Mood stable  Outcome: Ongoing     Problem: Nausea/Vomiting:  Goal: Absence of nausea/vomiting  Description: Absence of nausea/vomiting  Outcome: Ongoing     Problem: Pain - Acute:  Goal: Pain level will decrease  Description: Pain level will decrease  Outcome: Ongoing     Problem: Urinary Retention:  Goal: Urinary elimination within specified parameters  Description: Urinary elimination within specified parameters  Outcome: Ongoing     Problem: Venous Thromboembolism:  Goal: Will show no signs or symptoms of venous thromboembolism  Description: Will show no signs or symptoms of venous thromboembolism  Outcome: Ongoing  Goal: Absence of signs or symptoms of impaired coagulation  Description: Absence of signs or symptoms of impaired coagulation  Outcome: Ongoing

## 2021-06-01 NOTE — H&P
Obstetrics Admission History and Physical    CC:   Chief Complaint   Patient presents with    Scheduled        HPI: Smita Linton is a 36 y.o. Charanjit Solan at 39w0d who presents for scheduled RCD+bilateral salpingectomy. Doing well this AM, no complaints. Denies VB, LOF, ctx. +FM. Pregnancy has been complicate by hypothyroidism, history of gestational HTN, depression, prior CDx1, and family history of ovarian cancer. Review of Systems: The following ROS was otherwise negative, except as noted in the HPI: constitutional, HEENT, respiratory, cardiovascular, gastrointestinal, genitourinary, skin, musculoskeletal, neurological, psych. OBGYN Provider : Sam Daly    Obstetrical History:  OB History    Para Term  AB Living   2 1 1 0 0 1   SAB TAB Ectopic Molar Multiple Live Births   0 0 0 0 0 1      # Outcome Date GA Lbr Abdiaziz/2nd Weight Sex Delivery Anes PTL Lv   2 Current            1 Term 2016     CS-LTranv   SUN      Complications: Gestational hypertension, third trimester       Past Medical History:   Past Medical History:   Diagnosis Date    ACL injury tear     patient reports injury and ortho unable to find right ACL    Asthma     Depression     History of blood transfusion     Hypothyroid     Postpartum depression     PTSD (post-traumatic stress disorder)     taking duloxetine    Tachycardia     take 50mg Labetalol BID       Medications:  No current facility-administered medications on file prior to encounter.      Current Outpatient Medications on File Prior to Encounter   Medication Sig Dispense Refill    Fluticasone Propionate (FLONASE NA) by Nasal route      labetalol (NORMODYNE) 100 MG tablet Take 0.5 tablets by mouth every 12 hours 30 tablet 3    levothyroxine (SYNTHROID) 50 MCG tablet Take 1 tablet by mouth Daily 30 tablet 2    aspirin (ASPIRIN 81) 81 MG EC tablet Take 1 tablet by mouth daily 30 tablet 3    Prenatal Multivit-Min-Fe-FA (PRE-VON PO) Take by mouth     

## 2021-06-01 NOTE — LACTATION NOTE
This note was copied from a baby's chart. Lactation Progress Note      Data:   [facility] first [facility] feed after c/sec delivery. Mob states that baby has been feeding for a while and she is worried that he is not getting anything. Baby at breast with poor position. Action: Explained that the first breast feed is usually a long feed. Encouraged to allow baby to feed ad dyana. Also stressed importance of good position and latch. Assisted with good position and a good latch was achieved easily. Baby with some on and off suckling initially then [facility]. Mob expressed colostrum and was reassured that she does have milk. Breast feeding education initiated. Discouraged paci, bottles and pumping for the first few weeks. Encouraged to [facility] for [facility]/u assistance prn. Response: Verbalized and demonstrated understanding though distracted by p/o itching.

## 2021-06-02 LAB
HCT VFR BLD CALC: 27.8 % (ref 36–48)
HEMOGLOBIN: 9.5 G/DL (ref 12–16)
MCH RBC QN AUTO: 30.2 PG (ref 26–34)
MCHC RBC AUTO-ENTMCNC: 34.1 G/DL (ref 31–36)
MCV RBC AUTO: 88.6 FL (ref 80–100)
PDW BLD-RTO: 15.8 % (ref 12.4–15.4)
PLATELET # BLD: 127 K/UL (ref 135–450)
PMV BLD AUTO: 10.3 FL (ref 5–10.5)
RBC # BLD: 3.14 M/UL (ref 4–5.2)
WBC # BLD: 7.8 K/UL (ref 4–11)

## 2021-06-02 PROCEDURE — 1220000000 HC SEMI PRIVATE OB R&B

## 2021-06-02 PROCEDURE — 36415 COLL VENOUS BLD VENIPUNCTURE: CPT

## 2021-06-02 PROCEDURE — 6370000000 HC RX 637 (ALT 250 FOR IP): Performed by: OBSTETRICS & GYNECOLOGY

## 2021-06-02 PROCEDURE — 94640 AIRWAY INHALATION TREATMENT: CPT

## 2021-06-02 PROCEDURE — 2580000003 HC RX 258: Performed by: OBSTETRICS & GYNECOLOGY

## 2021-06-02 PROCEDURE — 85027 COMPLETE CBC AUTOMATED: CPT

## 2021-06-02 RX ORDER — POLYETHYLENE GLYCOL 3350 17 G/17G
17 POWDER, FOR SOLUTION ORAL DAILY PRN
Status: DISCONTINUED | OUTPATIENT
Start: 2021-06-02 | End: 2021-06-03 | Stop reason: HOSPADM

## 2021-06-02 RX ADMIN — DULOXETINE HYDROCHLORIDE 30 MG: 30 CAPSULE, DELAYED RELEASE ORAL at 08:22

## 2021-06-02 RX ADMIN — ACETAMINOPHEN 325 MG: 325 TABLET ORAL at 23:32

## 2021-06-02 RX ADMIN — IBUPROFEN 800 MG: 800 TABLET, FILM COATED ORAL at 05:39

## 2021-06-02 RX ADMIN — IBUPROFEN 800 MG: 800 TABLET, FILM COATED ORAL at 13:41

## 2021-06-02 RX ADMIN — IBUPROFEN 800 MG: 800 TABLET, FILM COATED ORAL at 21:35

## 2021-06-02 RX ADMIN — Medication 2 PUFF: at 08:27

## 2021-06-02 RX ADMIN — Medication 10 ML: at 08:22

## 2021-06-02 RX ADMIN — DOCUSATE SODIUM 100 MG: 100 CAPSULE, LIQUID FILLED ORAL at 21:35

## 2021-06-02 RX ADMIN — OXYCODONE HYDROCHLORIDE AND ACETAMINOPHEN 1 TABLET: 5; 325 TABLET ORAL at 11:35

## 2021-06-02 RX ADMIN — ACETAMINOPHEN 650 MG: 325 TABLET ORAL at 15:13

## 2021-06-02 RX ADMIN — LABETALOL HYDROCHLORIDE 50 MG: 100 TABLET, FILM COATED ORAL at 21:35

## 2021-06-02 RX ADMIN — ACETAMINOPHEN 650 MG: 325 TABLET ORAL at 10:35

## 2021-06-02 RX ADMIN — FERROUS SULFATE TAB 325 MG (65 MG ELEMENTAL FE) 325 MG: 325 (65 FE) TAB at 08:22

## 2021-06-02 RX ADMIN — Medication 10 ML: at 21:35

## 2021-06-02 RX ADMIN — LEVOTHYROXINE SODIUM 50 MCG: 0.03 TABLET ORAL at 07:04

## 2021-06-02 RX ADMIN — OXYCODONE HYDROCHLORIDE AND ACETAMINOPHEN 1 TABLET: 5; 325 TABLET ORAL at 19:22

## 2021-06-02 RX ADMIN — Medication 2 PUFF: at 20:31

## 2021-06-02 RX ADMIN — DOCUSATE SODIUM 100 MG: 100 CAPSULE, LIQUID FILLED ORAL at 08:21

## 2021-06-02 RX ADMIN — OXYCODONE HYDROCHLORIDE AND ACETAMINOPHEN 1 TABLET: 5; 325 TABLET ORAL at 23:32

## 2021-06-02 RX ADMIN — ACETAMINOPHEN 650 MG: 325 TABLET ORAL at 05:39

## 2021-06-02 RX ADMIN — LABETALOL HYDROCHLORIDE 50 MG: 100 TABLET, FILM COATED ORAL at 08:21

## 2021-06-02 ASSESSMENT — PAIN SCALES - GENERAL
PAINLEVEL_OUTOF10: 5
PAINLEVEL_OUTOF10: 5
PAINLEVEL_OUTOF10: 4
PAINLEVEL_OUTOF10: 5
PAINLEVEL_OUTOF10: 5
PAINLEVEL_OUTOF10: 6
PAINLEVEL_OUTOF10: 6
PAINLEVEL_OUTOF10: 7

## 2021-06-02 NOTE — PLAN OF CARE
Problem: Venous Thromboembolism - Risk of:  Goal: Will show no signs or symptoms of venous thromboembolism  Description: Will show no signs or symptoms of venous thromboembolism  Outcome: Ongoing

## 2021-06-02 NOTE — ANESTHESIA POSTPROCEDURE EVALUATION
Department of Anesthesiology  Postprocedure Note    Patient: Joline Kawasaki  MRN: [id number]  [place]: 1990  Date of evaluation: 2021  Time:  7:06 AM     Procedure Summary     Date: 21 Room / Location: [facility]    Anesthesia Start: 9862 Anesthesia Stop: 7779    Procedure:  SECTION (N/A Abdomen) Diagnosis: (repeat)    Surgeons: Leah Orta MD Responsible Provider: Vel Jameson MD    Anesthesia Type: spinal ASA Status: 3 - Emergent          Anesthesia Type: spinal    Breann Phase I: Breann Score: 9    Breann Phase II: Breann Score: 9    Last vitals: Reviewed and per EMR flowsheets. Anesthesia Post Evaluation    Level of consciousness: awake  Complications: no  Cardiovascular status: hemodynamically stable  Respiratory status: acceptable  Comments: No apparent complications from neuraxial anesthesia.

## 2021-06-02 NOTE — PLAN OF CARE
Problem: Pain:  Goal: Pain level will decrease  Description: Pain level will decrease  6/2/2021 1352 by Braulio Maya RN  Outcome: Ongoing  6/2/2021 1351 by Braulio Maya RN  Outcome: Ongoing     Problem: Discharge Planning:  Goal: Discharged to appropriate level of care  Description: Discharged to appropriate level of care  6/2/2021 1352 by Braulio Maya RN  Outcome: Ongoing  6/2/2021 1351 by Braulio Maya RN  Outcome: Ongoing     Problem: Fluid Volume - Imbalance:  Goal: Absence of postpartum hemorrhage signs and symptoms  Description: Absence of postpartum hemorrhage signs and symptoms  6/2/2021 1352 by Braulio Maya RN  Outcome: Ongoing  6/2/2021 1351 by Braulio Maya RN  Outcome: Ongoing  Goal: Absence of imbalanced fluid volume signs and symptoms  Description: Absence of imbalanced fluid volume signs and symptoms  6/2/2021 1352 by Braulio Maya RN  Outcome: Ongoing  6/2/2021 1351 by Braulio Maya RN  Outcome: Ongoing     Problem: Infection - Surgical Site:  Goal: Will show no infection signs and symptoms  Description: Will show no infection signs and symptoms  6/2/2021 1352 by Braulio Maya RN  Outcome: Ongoing  6/2/2021 1351 by Braulio Maya RN  Outcome: Ongoing     Problem: Mood - Altered:  Goal: Mood stable  Description: Mood stable  6/2/2021 1352 by Braulio Maya RN  Outcome: Ongoing  6/2/2021 1351 by Braulio Maya RN  Outcome: Ongoing     Problem: Nausea/Vomiting:  Goal: Absence of nausea/vomiting  Description: Absence of nausea/vomiting  6/2/2021 1352 by Braulio Maya RN  Outcome: Ongoing  6/2/2021 1351 by Braulio Maya RN  Outcome: Ongoing     Problem: Pain - Acute:  Goal: Pain level will decrease  Description: Pain level will decrease  6/2/2021 1352 by Braulio Maya RN  Outcome: Ongoing  6/2/2021 1351 by Braulio Maya RN  Outcome: Ongoing     Problem: Urinary Retention:  Goal: Urinary elimination within specified parameters  Description: Urinary elimination within specified parameters  6/2/2021 1352 by Justus Hendrickson RN  Outcome: Ongoing  6/2/2021 1351 by Justus Hendrickson RN  Outcome: Ongoing

## 2021-06-02 NOTE — PROGRESS NOTES
Asymptomatic     - Will repeat in AM for stability    4. Hx of GHTN     - Asymptomatic     - Normotensive    5. Hypothyroidism      - Continue synthroid     6. Depression     - Continue cymbalta    7. Family hx of ovarian cancer     - s/p risk reducing bilateral salpingectomy     - doing well    8. Breast feeding     - Continue lactation support as needed    9.  Infant information     - M, 8lb2.7oz, 5/9     - Does not desire circumcision    Disposition:   In house postpartum care    Derl Peabody, DO

## 2021-06-03 VITALS
HEART RATE: 106 BPM | DIASTOLIC BLOOD PRESSURE: 70 MMHG | WEIGHT: 206 LBS | HEIGHT: 60 IN | BODY MASS INDEX: 40.44 KG/M2 | SYSTOLIC BLOOD PRESSURE: 123 MMHG | TEMPERATURE: 98.3 F | OXYGEN SATURATION: 98 % | RESPIRATION RATE: 16 BRPM

## 2021-06-03 LAB
HCT VFR BLD CALC: 29.6 % (ref 36–48)
HEMOGLOBIN: 9.9 G/DL (ref 12–16)

## 2021-06-03 PROCEDURE — 94640 AIRWAY INHALATION TREATMENT: CPT

## 2021-06-03 PROCEDURE — 6370000000 HC RX 637 (ALT 250 FOR IP): Performed by: OBSTETRICS & GYNECOLOGY

## 2021-06-03 PROCEDURE — 36415 COLL VENOUS BLD VENIPUNCTURE: CPT

## 2021-06-03 PROCEDURE — 85014 HEMATOCRIT: CPT

## 2021-06-03 PROCEDURE — 85018 HEMOGLOBIN: CPT

## 2021-06-03 RX ORDER — OXYCODONE HYDROCHLORIDE AND ACETAMINOPHEN 5; 325 MG/1; MG/1
1 TABLET ORAL EVERY 6 HOURS PRN
Qty: 28 TABLET | Refills: 0 | Status: SHIPPED | OUTPATIENT
Start: 2021-06-03 | End: 2021-06-10

## 2021-06-03 RX ORDER — IBUPROFEN 800 MG/1
800 TABLET ORAL EVERY 8 HOURS PRN
Qty: 30 TABLET | Refills: 0 | Status: SHIPPED | OUTPATIENT
Start: 2021-06-03

## 2021-06-03 RX ORDER — OXYCODONE HYDROCHLORIDE 5 MG/1
10 TABLET ORAL EVERY 4 HOURS PRN
Status: DISCONTINUED | OUTPATIENT
Start: 2021-06-03 | End: 2021-06-03 | Stop reason: HOSPADM

## 2021-06-03 RX ORDER — DOCUSATE SODIUM 100 MG/1
100 CAPSULE, LIQUID FILLED ORAL 2 TIMES DAILY
Qty: 60 CAPSULE | Refills: 0 | Status: SHIPPED | OUTPATIENT
Start: 2021-06-03

## 2021-06-03 RX ORDER — OXYCODONE HYDROCHLORIDE 5 MG/1
5 TABLET ORAL EVERY 4 HOURS PRN
Status: DISCONTINUED | OUTPATIENT
Start: 2021-06-03 | End: 2021-06-03 | Stop reason: HOSPADM

## 2021-06-03 RX ORDER — FERROUS SULFATE 325(65) MG
325 TABLET ORAL
Qty: 180 TABLET | Refills: 1 | Status: SHIPPED | OUTPATIENT
Start: 2021-06-03

## 2021-06-03 RX ADMIN — OXYCODONE 5 MG: 5 TABLET ORAL at 08:20

## 2021-06-03 RX ADMIN — LABETALOL HYDROCHLORIDE 50 MG: 100 TABLET, FILM COATED ORAL at 08:20

## 2021-06-03 RX ADMIN — Medication 2 PUFF: at 10:23

## 2021-06-03 RX ADMIN — OXYCODONE HYDROCHLORIDE AND ACETAMINOPHEN 2 TABLET: 5; 325 TABLET ORAL at 03:45

## 2021-06-03 RX ADMIN — IBUPROFEN 800 MG: 800 TABLET, FILM COATED ORAL at 06:12

## 2021-06-03 RX ADMIN — POLYETHYLENE GLYCOL 3350 17 G: 17 POWDER, FOR SOLUTION ORAL at 03:45

## 2021-06-03 RX ADMIN — DOCUSATE SODIUM 100 MG: 100 CAPSULE, LIQUID FILLED ORAL at 08:21

## 2021-06-03 RX ADMIN — DULOXETINE HYDROCHLORIDE 30 MG: 30 CAPSULE, DELAYED RELEASE ORAL at 08:22

## 2021-06-03 RX ADMIN — ACETAMINOPHEN 650 MG: 325 TABLET ORAL at 08:20

## 2021-06-03 RX ADMIN — LEVOTHYROXINE SODIUM 50 MCG: 0.03 TABLET ORAL at 07:10

## 2021-06-03 ASSESSMENT — PAIN SCALES - GENERAL
PAINLEVEL_OUTOF10: 4

## 2021-06-03 NOTE — PROGRESS NOTES
[facility] Ob/Gyn  Post Partum Progress Note    Subjective:   Ardean Bamberger is a 36 y.o. [id number] s/p RLTCS with bilateral salpingectomy at 39w0d, POD #2. Patient is overall doing well. Reports pain is well controlled, has noticed some increase this morning, has responded to Percocet. Reports lochia is decreasing. Tolerating regular diet without nausea or vomiting. Ambulating without difficulty, denies dizziness on standing. Voiding without difficulty, + flatus. Denies headache, vision changes, RUQ pain, increased LE edema. Denies chest pain, shortness of breath, fever, chills. Pregnancy has been complicate by hypothyroidism, history of gestational HTN, depression, prior CDx1, and family history of ovarian cancer. Objective:   Vitals:    21 0611   BP: 119/73   Pulse: 90   Resp: 16   Temp: 98 °F (36.7 °C)   SpO2:       GENERAL APPEARANCE: alert, well appearing, in no apparent distress  LUNGS: clear to auscultation, no wheezes, rales or rhonchi, symmetric air entry  HEART: regular rate and rhythm, no murmurs  ABDOMEN POSTPARTUM: soft, appropriately tender to palpation, non-distended. No rebound or guarding. Fundus firm and non-tender below umbilicus. Incision with dressing in place, no drainage  EXTREMITIES: no redness or tenderness in the calves or thighs, edema 1+  SKIN: normal coloration and turgor, no rashes  NEUROLOGIC: alert, oriented, normal speech, no focal findings or movement disorder noted    Impression: S/P     Pertinent Labs:   Lab Results   Component Value Date    WBC 7.8 2021    HGB 9.5 (L) 2021    HCT 27.8 (L) 2021    MCV 88.6 2021     (L) 2021        Assessment / Plan:  Ardean Bamberger is a 36 y.o. Wilburt Riaz s/p RLTCS with bilateral salpingectomy at 39w0d     1. POD #2     - Doing well, meeting milestones    2. A+ / RI / GBS neg    3. Anemia     - Postpartum hgb 9.5     - Asymptomatic     - repeat pending     4.  Hx of GHTN     - Asymptomatic     - Normotensive    5. Hypothyroidism      - Continue synthroid     6. Depression     - Continue cymbalta    7. Family hx of ovarian cancer     - s/p risk reducing bilateral salpingectomy     - doing well     8. Breast feeding     - Continue lactation support as needed    9.  Infant information     - M, 8lb2.7oz, 5/9     - Does not desire circumcision    Disposition:   Dc to Home   FU in office in 2 weeks   PP instructions / return precautions reviewed     Nina Milan,

## 2021-06-03 NOTE — LACTATION NOTE
Lactation Progress Note      Data:   F/U on multip breast feeder who is preparing for d/c. Baby currently at breast with a good latch with SRS. Mob reports that baby has been feeding well. Output and wt loss are WNL. Action: Mob distracted but discharge preparations. Written breast feeding education provided and encouraged to call [de-identified] or Outpatient [facility] clinic for f/u after d/c. Response: Comfortable with breast feeding for d/c.

## 2021-06-03 NOTE — DISCHARGE INSTR - ACTIVITY
Call for increased pain, significant vaginal bleeding (soaking through 2 pads in < 1hr) or temperature greater than 101 degrees F. Nothing in vagina for 6 weeks (pelvic rest), including intercourse, tampons, toys, fingers. Advance activity as tolerated but limit lifting <10 lbs or weight of baby in carrier for first 2 weeks. Continue PNV. Take PRN medications as prescribed. FU in office in 2 weeks. Normal Diet     Please call with questions or concerns.    Jermaine Parker, DO

## 2021-06-03 NOTE — DISCHARGE SUMMARY
Department of Obstetrics and Gynecology  Postpartum Discharge Summary      Admit Date: 2021    Admit Diagnosis: History of  delivery [Z98.891], Bilateral Salpingectomy     Discharge Date: 21    Condition at Discharge: good    Discharge Diagnoses: RLTCS, BS    Discharge Disposition:  Home    Service: Obstetrics    Postpartum complications: none     Hospital Course: uncomplicated     Data:  Information for the patient's :  Gaby Boateng [[id number]]        Weight   Information for the patient's :  Gaby Boateng [[id number]]        Apgars   Information for the patient's :  Gaby Boateng [[id number]]         Disposition of Baby:  Home with mother      Current Discharge Medication List      START taking these medications    Details   oxyCODONE-acetaminophen (PERCOCET) 5-325 MG per tablet Take 1 tablet by mouth every 6 hours as needed for Pain for up to 7 days. Intended supply: 7 days.  Take lowest dose possible to manage pain  Qty: 28 tablet, Refills: 0    Comments: Reduce doses taken as pain becomes manageable  Associated Diagnoses: S/P repeat low transverse       ibuprofen (ADVIL;MOTRIN) 800 MG tablet Take 1 tablet by mouth every 8 hours as needed for Pain  Qty: 30 tablet, Refills: 0      ferrous sulfate (IRON 325) 325 (65 Fe) MG tablet Take 1 tablet by mouth daily (with breakfast)  Qty: 180 tablet, Refills: 1      docusate sodium (COLACE) 100 MG capsule Take 1 capsule by mouth 2 times daily  Qty: 60 capsule, Refills: 0         CONTINUE these medications which have NOT CHANGED    Details   Fluticasone Propionate (FLONASE NA) by Nasal route      labetalol (NORMODYNE) 100 MG tablet Take 0.5 tablets by mouth every 12 hours  Qty: 30 tablet, Refills: 3      levothyroxine (SYNTHROID) 50 MCG tablet Take 1 tablet by mouth Daily  Qty: 30 tablet, Refills: 2      Prenatal Multivit-Min-Fe-FA (PRE- PO) Take by mouth      budesonide-formoterol

## 2021-06-03 NOTE — PROGRESS NOTES
Discharge Phone Call Log  Patient Name: Leonid Valdez     [facility] Care Provider: Lesly John MD Discharge Date: 6/3/2021    Disposition of baby:    Phone Number: [phone] (home)     Attempts to Contact:  Date:    Nurse  Date:    Nurse  Date:    Nurse    1. Now that you are at home is your pain being well controlled? Y/N   What pain reducing measures are you using? ____________________________________        Information for the patient's :  Edwin Leong [[id number]]   Delivery Method: , Low Transverse     2. Are you currently  having any infant feeding issues? Y/N _____________________________ If yes, please explain: __________________________________________________________________  3. If breastfeeding, were you satisfied with the breastfeeding support services offered? Y/N  4.  Have you had to supplement? Y/N If yes, please explain: _____________________________________________________       Did you supplement while in the hospital, or begin formula supplementation at home?________________________________________  5. Did your OB provider offer you information about the benefits of breastfeeding during your prenatal visits? Y/N  6.  Have you made or have you already had your first appointment with the baby's doctor? Y/N If no, do you know when to schedule it? Y/N   7.  Have you scheduled your follow-up appointment? Y/N  If no, do you know when to schedule it? Y/N  8. Did staff discuss safe sleep during your stay? Y/N  Did you see the wall cling posted in your room explaining how to keep you and your baby safe? Y/N  10. Did your nurses and physicians include you in the plan of care, communicating with you respectfully? Y/N If no, please explain __________________________  11. Is there anyone in particular you would like to mention who provided care for you? ________________________________  12. Did your discharge occur in a timely manner?   Y/N If no, please explain __________________________  13. Do you have any other questions or concerns I can address today?  Y/N  __________________________________________________      Teaching During interview :_____________________________________________  ___________________________RN       Date:______________Time:________________

## 2021-06-08 ENCOUNTER — TELEPHONE (OUTPATIENT)
Dept: CARDIOLOGY CLINIC | Age: 31
End: 2021-06-08

## 2021-06-08 NOTE — TELEPHONE ENCOUNTER
----- Message from TIAN Villagomez CNP sent at 6/8/2021  2:53 PM EDT -----  Patient is on my schedule for 8/2 for a follow up from Dr. aMxine Kapoor. This was supposed to be made with Monty Harrison or Dyana Talamantes, not EP. Thanks.

## 2021-06-08 NOTE — TELEPHONE ENCOUNTER
06/08-Called ([phone]) unable to make contact, lm letting pt know that the appt for 08/02 was made out of error and made with the wrong NP.  Per [facility] pt needs to be seen by [facility] or [facility]

## 2021-06-10 NOTE — TELEPHONE ENCOUNTER
06/10-Called ([phone]) unable to make contact, LM letting pt know that the appt for 08/02 was made out of error and made with the wrong NP and that the appt has been cxl. Per NPBB pt needs to be seen by [facility] or [facility]. Cxl letter has been sent.

## 2021-06-15 ENCOUNTER — POSTPARTUM VISIT (OUTPATIENT)
Dept: OBGYN CLINIC | Age: 31
End: 2021-06-15

## 2021-06-15 VITALS
WEIGHT: 185.6 LBS | BODY MASS INDEX: 36.25 KG/M2 | HEART RATE: 93 BPM | TEMPERATURE: 97.6 F | SYSTOLIC BLOOD PRESSURE: 104 MMHG | DIASTOLIC BLOOD PRESSURE: 72 MMHG

## 2021-06-15 DIAGNOSIS — E03.8 OTHER SPECIFIED HYPOTHYROIDISM: ICD-10-CM

## 2021-06-15 DIAGNOSIS — Z98.891 S/P REPEAT LOW TRANSVERSE C-SECTION: Primary | ICD-10-CM

## 2021-06-15 PROCEDURE — 0503F POSTPARTUM CARE VISIT: CPT | Performed by: OBSTETRICS & GYNECOLOGY

## 2021-06-15 NOTE — PROGRESS NOTES
Postpartum Visit    Subjective:  36 y.o. [id number] female S/P uncomplicated  on 21 here for postpartum visit. The pregnancy was complicated by hypothyroidism, h/o gHTN, depression, prior , and familiy history of ovarian cancer. Postpartum course has been uncomplicated. Bleeding is similar to menses. Bowel function is normal. Bladder function is normal. Patient is not sexually active. Contraception method is s/p salpingectomy and abstience at is time. Baby has been doing well without problems. Baby is feeding breast. No other complaints are noted including headache, change in vision, fever, chills, chest pain, shortness of breath, nausea, vomiting, diarrhea or constipation. The patient denies any urinary complaints or calf tenderness. Review of Systems - The following ROS was otherwise negative, except as noted in the HPI: constitutional, respiratory, cardiovascular, gastrointestinal, genitourinary    Objective:  GENERAL APPEARANCE: alert, well appearing, in no apparent distress  LUNGS: clear to auscultation, no wheezes, rales or rhonchi, symmetric air entry  HEART: regular rate and rhythm, no murmurs  ABDOMEN POSTPARTUM: incision well-healed, without erythema, without hematoma and without evidence of infection  EXTREMITIES: no redness or tenderness in the calves or thighs, no edema    MWQ: 5    Impression:  36 y.o. [id number] s/p  Section on 21 here for her postpartum visit:    Plan:  1. S/P repeat low transverse   Doing well, routine care  - Feeding: breast, going well  - BCM: s/p salpingectomy  - Moods: no signs/sx PPD, denies SI/HI  - Bleeding: wnl, will monitor    2.  Other specified hypothyroidism  Continue synthroid at prepregnancy dose      Dispo: PRN or 4 weeks for next Theta MD Thiago

## 2021-06-16 PROBLEM — O99.340 DEPRESSION AFFECTING PREGNANCY: Status: RESOLVED | Noted: 2020-10-26 | Resolved: 2021-06-16

## 2021-06-16 PROBLEM — O44.40 LOW-LYING PLACENTA: Status: RESOLVED | Noted: 2021-01-14 | Resolved: 2021-06-01

## 2021-06-16 PROBLEM — Z34.83 PRENATAL CARE, SUBSEQUENT PREGNANCY IN THIRD TRIMESTER: Status: RESOLVED | Noted: 2020-10-26 | Resolved: 2021-06-16

## 2021-06-16 PROBLEM — F32.A DEPRESSION AFFECTING PREGNANCY: Status: RESOLVED | Noted: 2020-10-26 | Resolved: 2021-06-16

## (undated) DEVICE — SOLUTION IV IRRIG POUR BRL 0.9% SODIUM CHL 2F7124

## (undated) DEVICE — GARMENT COMPR L FOR 23IN CALF FLOTRN

## (undated) DEVICE — PAD,NON-ADHERENT,3X8,STERILE,LF,1/PK: Brand: MEDLINE

## (undated) DEVICE — SUTURE MCRYL SZ 3-0 L27IN ABSRB UD L60MM KS STR REV CUT Y523H

## (undated) DEVICE — SUTURE VCRL 2-0 XLH 27IN ABSRB BRAID VLT J581G

## (undated) DEVICE — BLADE CLIPPER GEN PURP NS

## (undated) DEVICE — Device

## (undated) DEVICE — S/USE RESUS KIT W/O MASK (10): Brand: FISHER & PAYKEL HEALTHCARE

## (undated) DEVICE — SUTURE VCRL SZ 3-0 L36IN ABSRB UD L36MM CT-1 1/2 CIR J944H

## (undated) DEVICE — CHLORAPREP 26ML ORANGE

## (undated) DEVICE — SUTURE ABSORBABLE BRAIDED 2-0 CT-1 27 IN UD VICRYL J259H

## (undated) DEVICE — SUTURE VCRL SZ 0 L36IN ABSRB UD L36MM CT-1 1/2 CIR J946H

## (undated) DEVICE — DRESSING COMP IS W4XL10IN PD W2XL8IN CNTCT LAYR ADH

## (undated) DEVICE — TRAY URIN CATH 16FR DRNGE BG STATLOK STBL DEV F SURSTP

## (undated) DEVICE — 3M™ STERI-STRIP™ COMPOUND BENZOIN TINCTURE 40 BAGS/CARTON 4 CARTONS/CASE C1544: Brand: 3M™ STERI-STRIP™

## (undated) DEVICE — GLOVE SURG SZ 65 THK91MIL LTX FREE SYN POLYISOPRENE